# Patient Record
Sex: FEMALE | Race: WHITE
[De-identification: names, ages, dates, MRNs, and addresses within clinical notes are randomized per-mention and may not be internally consistent; named-entity substitution may affect disease eponyms.]

---

## 2018-09-25 PROBLEM — Z00.00 ENCOUNTER FOR PREVENTIVE HEALTH EXAMINATION: Status: ACTIVE | Noted: 2018-09-25

## 2018-10-25 ENCOUNTER — APPOINTMENT (OUTPATIENT)
Dept: OBGYN | Facility: CLINIC | Age: 73
End: 2018-10-25
Payer: MEDICARE

## 2018-10-25 VITALS
HEIGHT: 60 IN | DIASTOLIC BLOOD PRESSURE: 74 MMHG | SYSTOLIC BLOOD PRESSURE: 120 MMHG | TEMPERATURE: 98.4 F | BODY MASS INDEX: 33.38 KG/M2 | WEIGHT: 170 LBS

## 2018-10-25 DIAGNOSIS — Z01.419 ENCOUNTER FOR GYNECOLOGICAL EXAMINATION (GENERAL) (ROUTINE) W/OUT ABNORMAL FINDINGS: ICD-10-CM

## 2018-10-25 PROCEDURE — 82270 OCCULT BLOOD FECES: CPT

## 2018-10-25 PROCEDURE — G0101: CPT

## 2018-10-29 LAB — CYTOLOGY CVX/VAG DOC THIN PREP: NORMAL

## 2021-01-01 ENCOUNTER — TRANSCRIPTION ENCOUNTER (OUTPATIENT)
Age: 76
End: 2021-01-01

## 2021-10-25 ENCOUNTER — TRANSCRIPTION ENCOUNTER (OUTPATIENT)
Age: 76
End: 2021-10-25

## 2021-11-24 ENCOUNTER — TRANSCRIPTION ENCOUNTER (OUTPATIENT)
Age: 76
End: 2021-11-24

## 2022-08-24 ENCOUNTER — INPATIENT (INPATIENT)
Facility: HOSPITAL | Age: 77
LOS: 4 days | Discharge: HOME | DRG: 417 | End: 2022-08-29
Attending: SURGERY | Admitting: SURGERY
Payer: MEDICARE

## 2022-08-24 VITALS — HEIGHT: 60 IN | WEIGHT: 164.91 LBS

## 2022-08-24 DIAGNOSIS — Z98.49 CATARACT EXTRACTION STATUS, UNSPECIFIED EYE: Chronic | ICD-10-CM

## 2022-08-24 DIAGNOSIS — Z96.659 PRESENCE OF UNSPECIFIED ARTIFICIAL KNEE JOINT: Chronic | ICD-10-CM

## 2022-08-24 DIAGNOSIS — K81.0 ACUTE CHOLECYSTITIS: ICD-10-CM

## 2022-08-24 LAB
ADD ON TEST-SPECIMEN IN LAB: SIGNIFICANT CHANGE UP
ALBUMIN SERPL ELPH-MCNC: 4.3 G/DL — SIGNIFICANT CHANGE UP (ref 3.3–5)
ALP SERPL-CCNC: 91 U/L — SIGNIFICANT CHANGE UP (ref 40–120)
ALT FLD-CCNC: 27 U/L — SIGNIFICANT CHANGE UP (ref 12–78)
ANION GAP SERPL CALC-SCNC: 4 MMOL/L — LOW (ref 5–17)
AST SERPL-CCNC: 25 U/L — SIGNIFICANT CHANGE UP (ref 15–37)
BASOPHILS # BLD AUTO: 0 K/UL — SIGNIFICANT CHANGE UP (ref 0–0.2)
BASOPHILS NFR BLD AUTO: 0 % — SIGNIFICANT CHANGE UP (ref 0–2)
BILIRUB SERPL-MCNC: 0.7 MG/DL — SIGNIFICANT CHANGE UP (ref 0.2–1.2)
BUN SERPL-MCNC: 14 MG/DL — SIGNIFICANT CHANGE UP (ref 7–23)
CALCIUM SERPL-MCNC: 9.7 MG/DL — SIGNIFICANT CHANGE UP (ref 8.5–10.1)
CHLORIDE SERPL-SCNC: 102 MMOL/L — SIGNIFICANT CHANGE UP (ref 96–108)
CO2 SERPL-SCNC: 30 MMOL/L — SIGNIFICANT CHANGE UP (ref 22–31)
CREAT SERPL-MCNC: 0.78 MG/DL — SIGNIFICANT CHANGE UP (ref 0.5–1.3)
EGFR: 78 ML/MIN/1.73M2 — SIGNIFICANT CHANGE UP
EOSINOPHIL # BLD AUTO: 0 K/UL — SIGNIFICANT CHANGE UP (ref 0–0.5)
EOSINOPHIL NFR BLD AUTO: 0 % — SIGNIFICANT CHANGE UP (ref 0–6)
FLUAV AG NPH QL: SIGNIFICANT CHANGE UP
FLUBV AG NPH QL: SIGNIFICANT CHANGE UP
GLUCOSE SERPL-MCNC: 164 MG/DL — HIGH (ref 70–99)
HCT VFR BLD CALC: 46.9 % — HIGH (ref 34.5–45)
HGB BLD-MCNC: 16.3 G/DL — HIGH (ref 11.5–15.5)
LACTATE SERPL-SCNC: 1.5 MMOL/L — SIGNIFICANT CHANGE UP (ref 0.7–2)
LIDOCAIN IGE QN: 218 U/L — SIGNIFICANT CHANGE UP (ref 73–393)
LYMPHOCYTES # BLD AUTO: 32 % — SIGNIFICANT CHANGE UP (ref 13–44)
LYMPHOCYTES # BLD AUTO: 5.81 K/UL — HIGH (ref 1–3.3)
MCHC RBC-ENTMCNC: 32.3 PG — SIGNIFICANT CHANGE UP (ref 27–34)
MCHC RBC-ENTMCNC: 34.8 GM/DL — SIGNIFICANT CHANGE UP (ref 32–36)
MCV RBC AUTO: 93.1 FL — SIGNIFICANT CHANGE UP (ref 80–100)
MONOCYTES # BLD AUTO: 0.54 K/UL — SIGNIFICANT CHANGE UP (ref 0–0.9)
MONOCYTES NFR BLD AUTO: 3 % — SIGNIFICANT CHANGE UP (ref 2–14)
NEUTROPHILS # BLD AUTO: 11.8 K/UL — HIGH (ref 1.8–7.4)
NEUTROPHILS NFR BLD AUTO: 64 % — SIGNIFICANT CHANGE UP (ref 43–77)
NRBC # BLD: SIGNIFICANT CHANGE UP /100 WBCS (ref 0–0)
PLATELET # BLD AUTO: 245 K/UL — SIGNIFICANT CHANGE UP (ref 150–400)
POTASSIUM SERPL-MCNC: 3.7 MMOL/L — SIGNIFICANT CHANGE UP (ref 3.5–5.3)
POTASSIUM SERPL-SCNC: 3.7 MMOL/L — SIGNIFICANT CHANGE UP (ref 3.5–5.3)
PROT SERPL-MCNC: 8.5 GM/DL — HIGH (ref 6–8.3)
RBC # BLD: 5.04 M/UL — SIGNIFICANT CHANGE UP (ref 3.8–5.2)
RBC # FLD: 12.1 % — SIGNIFICANT CHANGE UP (ref 10.3–14.5)
RSV RNA NPH QL NAA+NON-PROBE: SIGNIFICANT CHANGE UP
SARS-COV-2 RNA SPEC QL NAA+PROBE: SIGNIFICANT CHANGE UP
SODIUM SERPL-SCNC: 136 MMOL/L — SIGNIFICANT CHANGE UP (ref 135–145)
WBC # BLD: 18.16 K/UL — HIGH (ref 3.8–10.5)
WBC # FLD AUTO: 18.16 K/UL — HIGH (ref 3.8–10.5)

## 2022-08-24 PROCEDURE — 78226 HEPATOBILIARY SYSTEM IMAGING: CPT

## 2022-08-24 PROCEDURE — 85025 COMPLETE CBC W/AUTO DIFF WBC: CPT

## 2022-08-24 PROCEDURE — 80053 COMPREHEN METABOLIC PANEL: CPT

## 2022-08-24 PROCEDURE — 80048 BASIC METABOLIC PNL TOTAL CA: CPT

## 2022-08-24 PROCEDURE — 76705 ECHO EXAM OF ABDOMEN: CPT | Mod: 26

## 2022-08-24 PROCEDURE — 97116 GAIT TRAINING THERAPY: CPT | Mod: GP

## 2022-08-24 PROCEDURE — 71045 X-RAY EXAM CHEST 1 VIEW: CPT

## 2022-08-24 PROCEDURE — A9537: CPT

## 2022-08-24 PROCEDURE — 71275 CT ANGIOGRAPHY CHEST: CPT

## 2022-08-24 PROCEDURE — 82248 BILIRUBIN DIRECT: CPT

## 2022-08-24 PROCEDURE — 99285 EMERGENCY DEPT VISIT HI MDM: CPT | Mod: FS

## 2022-08-24 PROCEDURE — 88304 TISSUE EXAM BY PATHOLOGIST: CPT

## 2022-08-24 PROCEDURE — 86901 BLOOD TYPING SEROLOGIC RH(D): CPT

## 2022-08-24 PROCEDURE — 81001 URINALYSIS AUTO W/SCOPE: CPT

## 2022-08-24 PROCEDURE — 94760 N-INVAS EAR/PLS OXIMETRY 1: CPT

## 2022-08-24 PROCEDURE — 85610 PROTHROMBIN TIME: CPT

## 2022-08-24 PROCEDURE — 86850 RBC ANTIBODY SCREEN: CPT

## 2022-08-24 PROCEDURE — 83880 ASSAY OF NATRIURETIC PEPTIDE: CPT

## 2022-08-24 PROCEDURE — C1889: CPT

## 2022-08-24 PROCEDURE — 94640 AIRWAY INHALATION TREATMENT: CPT

## 2022-08-24 PROCEDURE — 36415 COLL VENOUS BLD VENIPUNCTURE: CPT

## 2022-08-24 PROCEDURE — 97162 PT EVAL MOD COMPLEX 30 MIN: CPT | Mod: GP

## 2022-08-24 PROCEDURE — 85730 THROMBOPLASTIN TIME PARTIAL: CPT

## 2022-08-24 PROCEDURE — 93005 ELECTROCARDIOGRAM TRACING: CPT

## 2022-08-24 PROCEDURE — 84100 ASSAY OF PHOSPHORUS: CPT

## 2022-08-24 PROCEDURE — 86900 BLOOD TYPING SEROLOGIC ABO: CPT

## 2022-08-24 PROCEDURE — 83735 ASSAY OF MAGNESIUM: CPT

## 2022-08-24 RX ORDER — PIPERACILLIN AND TAZOBACTAM 4; .5 G/20ML; G/20ML
3.38 INJECTION, POWDER, LYOPHILIZED, FOR SOLUTION INTRAVENOUS ONCE
Refills: 0 | Status: COMPLETED | OUTPATIENT
Start: 2022-08-24 | End: 2022-08-24

## 2022-08-24 RX ORDER — BRIMONIDINE TARTRATE 2 MG/MG
1 SOLUTION/ DROPS OPHTHALMIC DAILY
Refills: 0 | Status: DISCONTINUED | OUTPATIENT
Start: 2022-08-24 | End: 2022-08-24

## 2022-08-24 RX ORDER — BRIMONIDINE TARTRATE 2 MG/MG
1 SOLUTION/ DROPS OPHTHALMIC
Refills: 0 | Status: DISCONTINUED | OUTPATIENT
Start: 2022-08-24 | End: 2022-08-29

## 2022-08-24 RX ORDER — ACETAMINOPHEN 500 MG
1000 TABLET ORAL ONCE
Refills: 0 | Status: COMPLETED | OUTPATIENT
Start: 2022-08-24 | End: 2022-08-24

## 2022-08-24 RX ORDER — KETOROLAC TROMETHAMINE 30 MG/ML
15 SYRINGE (ML) INJECTION ONCE
Refills: 0 | Status: DISCONTINUED | OUTPATIENT
Start: 2022-08-24 | End: 2022-08-25

## 2022-08-24 RX ORDER — ONDANSETRON 8 MG/1
4 TABLET, FILM COATED ORAL ONCE
Refills: 0 | Status: COMPLETED | OUTPATIENT
Start: 2022-08-24 | End: 2022-08-24

## 2022-08-24 RX ORDER — ONDANSETRON 8 MG/1
4 TABLET, FILM COATED ORAL EVERY 6 HOURS
Refills: 0 | Status: DISCONTINUED | OUTPATIENT
Start: 2022-08-24 | End: 2022-08-29

## 2022-08-24 RX ORDER — SENNA PLUS 8.6 MG/1
2 TABLET ORAL AT BEDTIME
Refills: 0 | Status: DISCONTINUED | OUTPATIENT
Start: 2022-08-24 | End: 2022-08-29

## 2022-08-24 RX ORDER — ENOXAPARIN SODIUM 100 MG/ML
40 INJECTION SUBCUTANEOUS EVERY 24 HOURS
Refills: 0 | Status: DISCONTINUED | OUTPATIENT
Start: 2022-08-24 | End: 2022-08-29

## 2022-08-24 RX ORDER — AMLODIPINE BESYLATE 2.5 MG/1
5 TABLET ORAL AT BEDTIME
Refills: 0 | Status: DISCONTINUED | OUTPATIENT
Start: 2022-08-24 | End: 2022-08-28

## 2022-08-24 RX ORDER — CIPROFLOXACIN LACTATE 400MG/40ML
400 VIAL (ML) INTRAVENOUS EVERY 12 HOURS
Refills: 0 | Status: DISCONTINUED | OUTPATIENT
Start: 2022-08-24 | End: 2022-08-29

## 2022-08-24 RX ORDER — LOSARTAN POTASSIUM 100 MG/1
50 TABLET, FILM COATED ORAL DAILY
Refills: 0 | Status: DISCONTINUED | OUTPATIENT
Start: 2022-08-24 | End: 2022-08-28

## 2022-08-24 RX ORDER — CEFTRIAXONE 500 MG/1
1000 INJECTION, POWDER, FOR SOLUTION INTRAMUSCULAR; INTRAVENOUS EVERY 24 HOURS
Refills: 0 | Status: DISCONTINUED | OUTPATIENT
Start: 2022-08-24 | End: 2022-08-24

## 2022-08-24 RX ORDER — MORPHINE SULFATE 50 MG/1
2 CAPSULE, EXTENDED RELEASE ORAL EVERY 4 HOURS
Refills: 0 | Status: DISCONTINUED | OUTPATIENT
Start: 2022-08-24 | End: 2022-08-29

## 2022-08-24 RX ORDER — SODIUM CHLORIDE 9 MG/ML
1000 INJECTION INTRAMUSCULAR; INTRAVENOUS; SUBCUTANEOUS ONCE
Refills: 0 | Status: COMPLETED | OUTPATIENT
Start: 2022-08-24 | End: 2022-08-24

## 2022-08-24 RX ORDER — SODIUM CHLORIDE 9 MG/ML
1000 INJECTION INTRAMUSCULAR; INTRAVENOUS; SUBCUTANEOUS
Refills: 0 | Status: DISCONTINUED | OUTPATIENT
Start: 2022-08-24 | End: 2022-08-27

## 2022-08-24 RX ORDER — METRONIDAZOLE 500 MG
500 TABLET ORAL EVERY 8 HOURS
Refills: 0 | Status: DISCONTINUED | OUTPATIENT
Start: 2022-08-24 | End: 2022-08-29

## 2022-08-24 RX ORDER — ACETAMINOPHEN 500 MG
1000 TABLET ORAL ONCE
Refills: 0 | Status: DISCONTINUED | OUTPATIENT
Start: 2022-08-24 | End: 2022-08-29

## 2022-08-24 RX ADMIN — SODIUM CHLORIDE 1000 MILLILITER(S): 9 INJECTION INTRAMUSCULAR; INTRAVENOUS; SUBCUTANEOUS at 20:20

## 2022-08-24 RX ADMIN — AMLODIPINE BESYLATE 5 MILLIGRAM(S): 2.5 TABLET ORAL at 22:13

## 2022-08-24 RX ADMIN — ENOXAPARIN SODIUM 40 MILLIGRAM(S): 100 INJECTION SUBCUTANEOUS at 22:13

## 2022-08-24 RX ADMIN — PIPERACILLIN AND TAZOBACTAM 3.38 GRAM(S): 4; .5 INJECTION, POWDER, LYOPHILIZED, FOR SOLUTION INTRAVENOUS at 20:20

## 2022-08-24 RX ADMIN — Medication 100 MILLIGRAM(S): at 22:57

## 2022-08-24 RX ADMIN — ONDANSETRON 4 MILLIGRAM(S): 8 TABLET, FILM COATED ORAL at 19:08

## 2022-08-24 RX ADMIN — PIPERACILLIN AND TAZOBACTAM 200 GRAM(S): 4; .5 INJECTION, POWDER, LYOPHILIZED, FOR SOLUTION INTRAVENOUS at 20:10

## 2022-08-24 RX ADMIN — Medication 1000 MILLIGRAM(S): at 20:20

## 2022-08-24 RX ADMIN — SODIUM CHLORIDE 100 MILLILITER(S): 9 INJECTION INTRAMUSCULAR; INTRAVENOUS; SUBCUTANEOUS at 22:13

## 2022-08-24 RX ADMIN — Medication 400 MILLIGRAM(S): at 19:09

## 2022-08-24 RX ADMIN — SODIUM CHLORIDE 1000 MILLILITER(S): 9 INJECTION INTRAMUSCULAR; INTRAVENOUS; SUBCUTANEOUS at 19:09

## 2022-08-24 NOTE — ED STATDOCS - OBJECTIVE STATEMENT
76 y/o female with PMHx of bronchiectasis, HTN, gallstone presents to the ED c/o worsening epigastric pain radiating to back associated with nausea and vomiting started last night before eating. No fevers, cp, sob. No prior hx of abdominal surgeries.

## 2022-08-24 NOTE — ED STATDOCS - ATTENDING APP SHARED VISIT CONTRIBUTION OF CARE
I, Parviz Han MD, personally saw the patient with ALBARO.  I have personally performed a face to face diagnostic evaluation on this patient.  I have reviewed the ALBARO note and agree with the history, exam, and plan of care, except as noted.

## 2022-08-24 NOTE — H&P ADULT - ASSESSMENT
76 yo female with RUQ pain, WBC elevated, US suggestive of acute cholecystitis.    Plan:    Admit to surgery  Pain control  Nausea contrl  IV antibiotics  IVF  HIDA am  Medical clearance    Case discussed with Dr. Aldana

## 2022-08-24 NOTE — PHARMACOTHERAPY INTERVENTION NOTE - COMMENTS
Medication reconciliation completed.  Reviewed Medication list and confirmed med allergies with patient; confirmed with Dr. Quintana MedHx.  pt confirms that she did not take any oral medication today, only the morning dose of Alphagan P eye drops.

## 2022-08-24 NOTE — ED STATDOCS - NS ED ATTENDING STATEMENT MOD
This was a shared visit with the ALBARO. I reviewed and verified the documentation and independently performed the documented:

## 2022-08-24 NOTE — ED STATDOCS - CLINICAL SUMMARY MEDICAL DECISION MAKING FREE TEXT BOX
Labs, urine, US, Zofran, reassess. Labs, urine, US, Zofran, reassess.    PA: Patient presented with RUQ pain since last night. SONO +cam for acute cholecystitis. Admitted to surgery. Zosyn IVx1. ~Leon Goldman PA-C

## 2022-08-24 NOTE — ED ADULT NURSE NOTE - OBJECTIVE STATEMENT
presents to ed with with right upper abdominal pain. patient states that she was sent in by GI doctor. patient states constantly burping and feeling gassy

## 2022-08-24 NOTE — ED STATDOCS - PROGRESS NOTE DETAILS
PA: Patient is a 76 y/o female with PMHx of bronchiectasis, HTN, gallstone who presents to [facility] c/o  worsening epigastric pain radiating to back associated with nausea and vomiting since last night. No fever, cp, sob. No prior hx of abdominal surgeries. ~Leon Golmdan PA-C +Cholecystitis on RUQ SONO. Paged surgery resident, will call back. ~Leon Goldman PA-C Spoke with surgery, will admit to dr. Aldana, wants medicine consult for clearance, NPO, ABX. ~Leon Goldman PA-C Spoke with dr. Roche for med clearance. Dr. Roche would like a direct phone call from surgery for medical clearance. ~Leon Goldman PA-C

## 2022-08-24 NOTE — H&P ADULT - NSHPPHYSICALEXAM_GEN_ALL_CORE
Vitals:  T(C): 36.9 (08-24 @ 17:29), Max: 36.9 (08-24 @ 17:29)  HR: 75 (08-24 @ 17:29) (75 - 75)  BP: 149/76 (08-24 @ 17:29) (149/76 - 149/76)  RR: 18 (08-24 @ 17:29) (18 - 18)  SpO2: 94% (08-24 @ 17:29) (94% - 94%)      Physical Exam:  General: AAOx3, Well developed, NAD  Chest: Normal respiratory effort  Heart: RRR  Abdomen: Soft, tender to palpation in the RUQ  Neuro/Psych: No localized deficits. Normal speech, normal tone  Skin: Normal, no rashes, no lesions noted.   Extremities: Warm, well perfused, no edema, Pulses intact

## 2022-08-24 NOTE — ED STATDOCS - PHYSICAL EXAMINATION
General: AAOx3, NAD  HEENT: NCAT  Cardiac: Normal rate and rhythym, no murmurs, normal peripheral perfusion  Respiratory: Normal rate and effort. CTAB  GI: Soft, nondistended. +moderate RUQ TTP, -burnett's. +mild epigastric TTP. no CVAT.  Neuro: No focal deficits. FRAUSTO equally x4, sensation to light touch intact throughout  MSK: FROMx4, no focal bony tenderness, no peripheral edema  Skin: No rash Attending: General: AAOx3, NAD  HEENT: NCAT  Cardiac: Normal rate and rhythym, no murmurs, normal peripheral perfusion  Respiratory: Normal rate and effort. CTAB  GI: Soft, nondistended. +moderate RUQ TTP, -burnett's. +mild epigastric TTP. no CVAT.  Neuro: No focal deficits. FRAUSTO equally x4, sensation to light touch intact throughout  MSK: FROMx4, no focal bony tenderness, no peripheral edema  Skin: No rash

## 2022-08-24 NOTE — ED STATDOCS - NS_ ATTENDINGSCRIBEDETAILS _ED_A_ED_FT
I, Parviz Han MD,  performed the initial face to face bedside interview with this patient regarding history of present illness, review of symptoms and relevant past medical, social and family history.  I completed an independent physical examination.  I was the initial provider who evaluated this patient. I have signed out the follow up of any pending tests (i.e. labs, radiological studies) to the ALBARO.  I have communicated the patient’s plan of care and disposition with the ALBARO.  The history, relevant review of systems, past medical and surgical history, medical decision making, and physical examination was documented by the scribe in my presence and I attest to the accuracy of the documentation.

## 2022-08-24 NOTE — PATIENT PROFILE ADULT - FALL HARM RISK - UNIVERSAL INTERVENTIONS
Bed in lowest position, wheels locked, appropriate side rails in place/Call bell, personal items and telephone in reach/Instruct patient to call for assistance before getting out of bed or chair/Non-slip footwear when patient is out of bed/[place] to call system/Physically safe environment - no spills, clutter or unnecessary equipment/Purposeful Proactive Rounding/Room/bathroom lighting operational, light cord in reach

## 2022-08-24 NOTE — H&P ADULT - NSHPLABSRESULTS_GEN_ALL_CORE
08-24 @ 18:51                    16.3  CBC: 18.16>)-------(<245                     46.9                 136 | 102 | 14    CMP:  ----------------------< 164               3.7 | 30 | 0.78                      Ca:9.7  Phos:-  Mg:-               0.7|      |25        LFTs:  ------|91|-----             -|      |-      Current Inpatient Medications:  acetaminophen   IVPB .. 1000 milliGRAM(s) IV Intermittent once  amLODIPine   Tablet 5 milliGRAM(s) Oral at bedtime  enoxaparin Injectable 40 milliGRAM(s) SubCutaneous every 24 hours  ketorolac   Injectable 15 milliGRAM(s) IV Push once PRN  losartan 50 milliGRAM(s) Oral daily  morphine  - Injectable 2 milliGRAM(s) IV Push every 4 hours PRN  ondansetron Injectable 4 milliGRAM(s) IV Push every 6 hours PRN  senna 2 Tablet(s) Oral at bedtime PRN  sodium chloride 0.9%. 1000 milliLiter(s) (100 mL/Hr) IV Continuous <Continuous>    < from: US Abdomen Upper Quadrant Right (08.24.22 @ 18:54) >    IMPRESSION:    Gallstones with gallbladder wall thickening and sonographic Urena sign   suggesting acute cholecystitis.    < end of copied text >

## 2022-08-24 NOTE — ED STATDOCS - NS ED ROS FT
Constitutional: No fevers, chills, or sweats.  Cardiac: No chest pain, exertional dyspnea, orthopnea  Respiratory: No shortness of breath, no cough  GI: +abd pain, +n/v  Neuro: No headaches, no neck pain/stiffness, no numbness  All other systems reviewed and are negative unless otherwise stated in the HPI.

## 2022-08-24 NOTE — ED ADULT TRIAGE NOTE - CHIEF COMPLAINT QUOTE
abd pain associated with nausea and vomiting started last night. hx gallstones and ulcers. pt was sent by GI for further evaluation. Denies fever/chills, diarrhea.

## 2022-08-25 LAB
ALBUMIN SERPL ELPH-MCNC: 3.4 G/DL — SIGNIFICANT CHANGE UP (ref 3.3–5)
ALP SERPL-CCNC: 76 U/L — SIGNIFICANT CHANGE UP (ref 40–120)
ALT FLD-CCNC: 26 U/L — SIGNIFICANT CHANGE UP (ref 12–78)
ANION GAP SERPL CALC-SCNC: 3 MMOL/L — LOW (ref 5–17)
APPEARANCE UR: CLEAR — SIGNIFICANT CHANGE UP
AST SERPL-CCNC: 33 U/L — SIGNIFICANT CHANGE UP (ref 15–37)
BASOPHILS # BLD AUTO: 0.04 K/UL — SIGNIFICANT CHANGE UP (ref 0–0.2)
BASOPHILS NFR BLD AUTO: 0.3 % — SIGNIFICANT CHANGE UP (ref 0–2)
BILIRUB SERPL-MCNC: 0.8 MG/DL — SIGNIFICANT CHANGE UP (ref 0.2–1.2)
BILIRUB UR-MCNC: NEGATIVE — SIGNIFICANT CHANGE UP
BUN SERPL-MCNC: 9 MG/DL — SIGNIFICANT CHANGE UP (ref 7–23)
CALCIUM SERPL-MCNC: 9.2 MG/DL — SIGNIFICANT CHANGE UP (ref 8.5–10.1)
CHLORIDE SERPL-SCNC: 107 MMOL/L — SIGNIFICANT CHANGE UP (ref 96–108)
CO2 SERPL-SCNC: 30 MMOL/L — SIGNIFICANT CHANGE UP (ref 22–31)
COLOR SPEC: YELLOW — SIGNIFICANT CHANGE UP
CREAT SERPL-MCNC: 0.77 MG/DL — SIGNIFICANT CHANGE UP (ref 0.5–1.3)
DIFF PNL FLD: ABNORMAL
EGFR: 79 ML/MIN/1.73M2 — SIGNIFICANT CHANGE UP
EOSINOPHIL # BLD AUTO: 0.02 K/UL — SIGNIFICANT CHANGE UP (ref 0–0.5)
EOSINOPHIL NFR BLD AUTO: 0.1 % — SIGNIFICANT CHANGE UP (ref 0–6)
GLUCOSE SERPL-MCNC: 182 MG/DL — HIGH (ref 70–99)
GLUCOSE UR QL: NEGATIVE — SIGNIFICANT CHANGE UP
HCT VFR BLD CALC: 43.5 % — SIGNIFICANT CHANGE UP (ref 34.5–45)
HGB BLD-MCNC: 14.6 G/DL — SIGNIFICANT CHANGE UP (ref 11.5–15.5)
IMM GRANULOCYTES NFR BLD AUTO: 0.6 % — SIGNIFICANT CHANGE UP (ref 0–1.5)
KETONES UR-MCNC: NEGATIVE — SIGNIFICANT CHANGE UP
LEUKOCYTE ESTERASE UR-ACNC: NEGATIVE — SIGNIFICANT CHANGE UP
LYMPHOCYTES # BLD AUTO: 22.9 % — SIGNIFICANT CHANGE UP (ref 13–44)
LYMPHOCYTES # BLD AUTO: 3.35 K/UL — HIGH (ref 1–3.3)
MAGNESIUM SERPL-MCNC: 2.3 MG/DL — SIGNIFICANT CHANGE UP (ref 1.6–2.6)
MCHC RBC-ENTMCNC: 31.9 PG — SIGNIFICANT CHANGE UP (ref 27–34)
MCHC RBC-ENTMCNC: 33.6 GM/DL — SIGNIFICANT CHANGE UP (ref 32–36)
MCV RBC AUTO: 95 FL — SIGNIFICANT CHANGE UP (ref 80–100)
MONOCYTES # BLD AUTO: 1.13 K/UL — HIGH (ref 0–0.9)
MONOCYTES NFR BLD AUTO: 7.7 % — SIGNIFICANT CHANGE UP (ref 2–14)
NEUTROPHILS # BLD AUTO: 9.98 K/UL — HIGH (ref 1.8–7.4)
NEUTROPHILS NFR BLD AUTO: 68.4 % — SIGNIFICANT CHANGE UP (ref 43–77)
NITRITE UR-MCNC: NEGATIVE — SIGNIFICANT CHANGE UP
PH UR: 6 — SIGNIFICANT CHANGE UP (ref 5–8)
PHOSPHATE SERPL-MCNC: 2 MG/DL — LOW (ref 2.5–4.5)
PLATELET # BLD AUTO: 205 K/UL — SIGNIFICANT CHANGE UP (ref 150–400)
POTASSIUM SERPL-MCNC: 3.6 MMOL/L — SIGNIFICANT CHANGE UP (ref 3.5–5.3)
POTASSIUM SERPL-SCNC: 3.6 MMOL/L — SIGNIFICANT CHANGE UP (ref 3.5–5.3)
PROT SERPL-MCNC: 7.3 GM/DL — SIGNIFICANT CHANGE UP (ref 6–8.3)
PROT UR-MCNC: 30 MG/DL
RBC # BLD: 4.58 M/UL — SIGNIFICANT CHANGE UP (ref 3.8–5.2)
RBC # FLD: 12.3 % — SIGNIFICANT CHANGE UP (ref 10.3–14.5)
SODIUM SERPL-SCNC: 140 MMOL/L — SIGNIFICANT CHANGE UP (ref 135–145)
SP GR SPEC: 1.01 — SIGNIFICANT CHANGE UP (ref 1.01–1.02)
UROBILINOGEN FLD QL: NEGATIVE — SIGNIFICANT CHANGE UP
WBC # BLD: 14.61 K/UL — HIGH (ref 3.8–10.5)
WBC # FLD AUTO: 14.61 K/UL — HIGH (ref 3.8–10.5)

## 2022-08-25 PROCEDURE — 78226 HEPATOBILIARY SYSTEM IMAGING: CPT | Mod: 26

## 2022-08-25 PROCEDURE — 93010 ELECTROCARDIOGRAM REPORT: CPT

## 2022-08-25 PROCEDURE — 99221 1ST HOSP IP/OBS SF/LOW 40: CPT

## 2022-08-25 PROCEDURE — 71045 X-RAY EXAM CHEST 1 VIEW: CPT | Mod: 26

## 2022-08-25 RX ORDER — LUTEIN 20 MG
1 CAPSULE ORAL
Qty: 0 | Refills: 0 | DISCHARGE

## 2022-08-25 RX ORDER — CHOLECALCIFEROL (VITAMIN D3) 125 MCG
1 CAPSULE ORAL
Qty: 0 | Refills: 0 | DISCHARGE

## 2022-08-25 RX ORDER — DESVENLAFAXINE 50 MG/1
1 TABLET, EXTENDED RELEASE ORAL
Qty: 0 | Refills: 0 | DISCHARGE

## 2022-08-25 RX ORDER — MORPHINE SULFATE 50 MG/1
2 CAPSULE, EXTENDED RELEASE ORAL ONCE
Refills: 0 | Status: DISCONTINUED | OUTPATIENT
Start: 2022-08-25 | End: 2022-08-25

## 2022-08-25 RX ORDER — OMEGA-3 ACID ETHYL ESTERS 1 G
1 CAPSULE ORAL
Qty: 0 | Refills: 0 | DISCHARGE

## 2022-08-25 RX ORDER — ACETAMINOPHEN 500 MG
1000 TABLET ORAL ONCE
Refills: 0 | Status: COMPLETED | OUTPATIENT
Start: 2022-08-25 | End: 2022-08-25

## 2022-08-25 RX ORDER — POTASSIUM PHOSPHATE, MONOBASIC POTASSIUM PHOSPHATE, DIBASIC 236; 224 MG/ML; MG/ML
15 INJECTION, SOLUTION INTRAVENOUS ONCE
Refills: 0 | Status: COMPLETED | OUTPATIENT
Start: 2022-08-25 | End: 2022-08-25

## 2022-08-25 RX ORDER — AZITHROMYCIN 500 MG/1
250 TABLET, FILM COATED ORAL
Refills: 0 | Status: DISCONTINUED | OUTPATIENT
Start: 2022-08-25 | End: 2022-08-29

## 2022-08-25 RX ORDER — NEBIVOLOL HYDROCHLORIDE 5 MG/1
1 TABLET ORAL
Qty: 0 | Refills: 0 | DISCHARGE

## 2022-08-25 RX ORDER — DESVENLAFAXINE 50 MG/1
25 TABLET, EXTENDED RELEASE ORAL DAILY
Refills: 0 | Status: DISCONTINUED | OUTPATIENT
Start: 2022-08-25 | End: 2022-08-29

## 2022-08-25 RX ORDER — BRIMONIDINE TARTRATE 2 MG/MG
1 SOLUTION/ DROPS OPHTHALMIC
Qty: 0 | Refills: 0 | DISCHARGE

## 2022-08-25 RX ORDER — NEBIVOLOL HYDROCHLORIDE 5 MG/1
10 TABLET ORAL DAILY
Refills: 0 | Status: DISCONTINUED | OUTPATIENT
Start: 2022-08-25 | End: 2022-08-29

## 2022-08-25 RX ORDER — UBIDECARENONE 100 MG
1 CAPSULE ORAL
Qty: 0 | Refills: 0 | DISCHARGE

## 2022-08-25 RX ORDER — OLMESARTAN MEDOXOMIL 5 MG/1
1 TABLET, FILM COATED ORAL
Qty: 0 | Refills: 0 | DISCHARGE

## 2022-08-25 RX ADMIN — Medication 1000 MILLIGRAM(S): at 21:58

## 2022-08-25 RX ADMIN — Medication 100 MILLIGRAM(S): at 06:58

## 2022-08-25 RX ADMIN — Medication 200 MILLIGRAM(S): at 05:26

## 2022-08-25 RX ADMIN — Medication 200 MILLIGRAM(S): at 21:51

## 2022-08-25 RX ADMIN — Medication 400 MILLIGRAM(S): at 15:34

## 2022-08-25 RX ADMIN — BRIMONIDINE TARTRATE 1 DROP(S): 2 SOLUTION/ DROPS OPHTHALMIC at 21:29

## 2022-08-25 RX ADMIN — Medication 100 MILLIGRAM(S): at 23:13

## 2022-08-25 RX ADMIN — Medication 400 MILLIGRAM(S): at 21:28

## 2022-08-25 RX ADMIN — AMLODIPINE BESYLATE 5 MILLIGRAM(S): 2.5 TABLET ORAL at 21:29

## 2022-08-25 RX ADMIN — LOSARTAN POTASSIUM 50 MILLIGRAM(S): 100 TABLET, FILM COATED ORAL at 09:08

## 2022-08-25 RX ADMIN — Medication 15 MILLIGRAM(S): at 06:40

## 2022-08-25 RX ADMIN — POTASSIUM PHOSPHATE, MONOBASIC POTASSIUM PHOSPHATE, DIBASIC 62.5 MILLIMOLE(S): 236; 224 INJECTION, SOLUTION INTRAVENOUS at 15:04

## 2022-08-25 RX ADMIN — MORPHINE SULFATE 2 MILLIGRAM(S): 50 CAPSULE, EXTENDED RELEASE ORAL at 11:02

## 2022-08-25 RX ADMIN — DESVENLAFAXINE 25 MILLIGRAM(S): 50 TABLET, EXTENDED RELEASE ORAL at 20:00

## 2022-08-25 RX ADMIN — Medication 100 MILLIGRAM(S): at 13:07

## 2022-08-25 RX ADMIN — ENOXAPARIN SODIUM 40 MILLIGRAM(S): 100 INJECTION SUBCUTANEOUS at 21:29

## 2022-08-25 RX ADMIN — Medication 1000 MILLIGRAM(S): at 15:49

## 2022-08-25 RX ADMIN — BRIMONIDINE TARTRATE 1 DROP(S): 2 SOLUTION/ DROPS OPHTHALMIC at 09:08

## 2022-08-25 RX ADMIN — MORPHINE SULFATE 2 MILLIGRAM(S): 50 CAPSULE, EXTENDED RELEASE ORAL at 11:17

## 2022-08-25 NOTE — CONSULT NOTE ADULT - SUBJECTIVE AND OBJECTIVE BOX
Chief Complaint:    HPI:  76 y/o female with PMHx of bronchiectasis, HTN, gallstone presents to the ED c/o worsening epigastric pain radiating to back associated with nausea and vomiting started last night before eating. No fevers, cp, sob. No prior hx of abdominal surgeries. (24 Aug 2022 21:25)      REVIEW OF SYSTEMS:    CONSTITUTIONAL: No weakness, fevers or chills  EYES/ENT: No visual changes;  No vertigo or throat pain   NECK: No pain or stiffness  RESPIRATORY: No cough, wheezing, hemoptysis; No shortness of breath  CARDIOVASCULAR: No chest pain or palpitations  GASTROINTESTINAL: No abdominal or epigastric pain. No nausea, vomiting, or hematemesis; No diarrhea or constipation. No melena or hematochezia.  GENITOURINARY: No dysuria, frequency or hematuria  NEUROLOGICAL: No numbness or weakness  SKIN: No itching, burning, rashes, or lesions   All other review of systems is negative unless indicated above    PAST MEDICAL & SURGICAL HISTORY:  Hypertension      OA (osteoarthritis)      History of knee replacement      S/P cataract surgery          Social history : Denies ETOH use, IVDU, smoking   No pertinent history of stroke, MI, cancer , DM  in first degree relatives.    Vital Signs Last 24 Hrs  T(C): 36.7 (25 Aug 2022 15:06), Max: 36.7 (25 Aug 2022 15:06)  T(F): 98.1 (25 Aug 2022 15:06), Max: 98.1 (25 Aug 2022 15:06)  HR: 84 (25 Aug 2022 15:06) (78 - 84)  BP: 148/57 (25 Aug 2022 15:06) (120/69 - 154/76)  BP(mean): --  RR: 18 (25 Aug 2022 15:06) (18 - 18)  SpO2: 92% (25 Aug 2022 15:06) (92% - 97%)    Parameters below as of 25 Aug 2022 15:06  Patient On (Oxygen Delivery Method): room air        I&O's Summary      CAPILLARY BLOOD GLUCOSE          PHYSICAL EXAM:    Constitutional: NAD, awake and alert, well-developed  HEENT: PERR, EOMI, Normal Hearing, MMM  Neck: Soft and supple, No LAD, No JVD  Respiratory: Breath sounds are clear bilaterally, No wheezing, rales or rhonchi  Cardiovascular: S1 and S2, regular rate and rhythm, no Murmurs, gallops or rubs  Gastrointestinal: Bowel Sounds present, soft, nontender, nondistended, no guarding, no rebound  Extremities: No peripheral edema  Vascular: 2+ peripheral pulses  Neurological: A/O x 3, no focal deficits  Musculoskeletal: 5/5 strength b/l upper and lower extremities  Skin: No rashes    Medications:  MEDICATIONS  (STANDING):  acetaminophen   IVPB .. 1000 milliGRAM(s) IV Intermittent once  amLODIPine   Tablet 5 milliGRAM(s) Oral at bedtime  azithromycin   Tablet 250 milliGRAM(s) Oral <User Schedule>  brimonidine 0.1% Ophthalmic Solution 1 Drop(s) Left EYE two times a day  ciprofloxacin   IVPB 400 milliGRAM(s) IV Intermittent every 12 hours  enoxaparin Injectable 40 milliGRAM(s) SubCutaneous every 24 hours  losartan 50 milliGRAM(s) Oral daily  metroNIDAZOLE  IVPB 500 milliGRAM(s) IV Intermittent every 8 hours  sodium chloride 0.9%. 1000 milliLiter(s) (100 mL/Hr) IV Continuous <Continuous>      Labs: All Labs Reviewed:                        14.6   14.61 )-----------( 205      ( 25 Aug 2022 06:51 )             43.5     08-25    140  |  107  |  9   ----------------------------<  182<H>  3.6   |  30  |  0.77    Ca    9.2      25 Aug 2022 06:51  Phos  2.0     08-25  Mg     2.3     08-25    TPro  7.3  /  Alb  3.4  /  TBili  0.8  /  DBili  x   /  AST  33  /  ALT  26  /  AlkPhos  76  08-25    PT/INR - ( 24 Aug 2022 18:51 )   PT: 12.8 sec;   INR: 1.10 ratio               Blood Culture:     RADIOLOGY/EKG: all reviewed     Assessment/Plan:    DVT PPX:    Advance Directive:  - Full code, diagnosis, prognosis discussed  - 17 minutes spend    Disposition:    Time Span:    Thank you for consult, will follow with you.  Chief Complaint: RUQ pain, nausea, vomiting     HPI:    76 y/o female with PMHx of bronchiectasis, HTN, HLD , OP, GERD, Anxiety, Cholelithiasis admitted to surgery service with  c/o worsening epigastric pain radiating to back associated with nausea and vomiting started  night before eating. No fevers, cp, sob. No prior hx of abdominal surgeries.      8/25 - pt seen and examined for preop clearance, denies cp, dyspnea, RUQ pain improved, afebrile, plan discussed, medications reviewed       REVIEW OF SYSTEMS:    CONSTITUTIONAL: No weakness, fevers or chills  EYES/ENT: No visual changes;  No vertigo or throat pain   NECK: No pain or stiffness  RESPIRATORY: No cough, wheezing, hemoptysis; No shortness of breath  CARDIOVASCULAR: No chest pain or palpitations  GASTROINTESTINAL: +  RUQ abdominal , no  epigastric pain. No nausea, vomiting, or hematemesis; No diarrhea or constipation. No melena or hematochezia.  GENITOURINARY: No dysuria, frequency or hematuria  NEUROLOGICAL: No numbness or weakness  SKIN: No itching, burning, rashes, or lesions   All other review of systems is negative unless indicated above    PAST MEDICAL & SURGICAL HISTORY:  Hypertension  OA (osteoarthritis)  History of knee replacement  S/P cataract surgery    Social history : Denies ETOH use, IVDU, smoking   No pertinent history of stroke, MI,  DM  in first degree relatives.    Vital Signs Last 24 Hrs  T(C): 36.7 (25 Aug 2022 15:06), Max: 36.7 (25 Aug 2022 15:06)  T(F): 98.1 (25 Aug 2022 15:06), Max: 98.1 (25 Aug 2022 15:06)  HR: 84 (25 Aug 2022 15:06) (78 - 84)  BP: 148/57 (25 Aug 2022 15:06) (120/69 - 154/76)  BP(mean): --  RR: 18 (25 Aug 2022 15:06) (18 - 18)  SpO2: 92% (25 Aug 2022 15:06) (92% - 97%)    Parameters below as of 25 Aug 2022 15:06  Patient On (Oxygen Delivery Method): room air        I&O's Summary      CAPILLARY BLOOD GLUCOSE          PHYSICAL EXAM:    Constitutional: NAD, awake and alert, well-developed  HEENT: PERR, EOMI, Normal Hearing, MMM  Neck: Soft and supple, No LAD, No JVD  Respiratory: Breath sounds are clear bilaterally, No wheezing, rales or rhonchi  Cardiovascular: S1 and S2, regular rate and rhythm, no Murmurs, gallops or rubs  Gastrointestinal: Bowel Sounds present, soft, RUQ tenderness  nondistended, no guarding, no rebound  Extremities: No peripheral edema  Vascular: 2+ peripheral pulses  Neurological: A/O x 3, no focal deficits  Musculoskeletal: 5/5 strength b/l upper and lower extremities  Skin: No rashes      Labs: All Labs Reviewed:                        14.6   14.61 )-----------( 205      ( 25 Aug 2022 06:51 )             43.5     08-25    140  |  107  |  9   ----------------------------<  182<H>  3.6   |  30  |  0.77    Ca    9.2      25 Aug 2022 06:51  Phos  2.0     08-25  Mg     2.3     08-25    TPro  7.3  /  Alb  3.4  /  TBili  0.8  /  DBili  x   /  AST  33  /  ALT  26  /  AlkPhos  76  08-25    PT/INR - ( 24 Aug 2022 18:51 )   PT: 12.8 sec;   INR: 1.10 ratio        Blood Culture:     RADIOLOGY/EKG: all reviewed    12 Lead ECG (08.25.22 @ 06:58) >  Ventricular Rate 77 BPM    Atrial Rate 77 BPM    P-R Interval 164 ms    QRS Duration 88 ms    Q-T Interval 428 ms    QTC Calculation(Bazett) 484 ms    P Axis 46 degrees    R Axis 27 degrees    T Axis 45 degrees    Diagnosis Line Normal sinus rhythm  Prolonged QT  When compared with ECG of 14-SEP-2016 15:19,  No significant change was found    < from: NM Hepatobiliary Imaging (08.25.22 @ 12:20) >  IMPRESSION:  Abnormal morphine-augmented hepatobiliary scan compatible   with acute cholecystitis.    < end of copied text >  < from: US Abdomen Upper Quadrant Right (08.24.22 @ 18:54) >  FINDINGS:  Liver: Hepatic steatosis.  Bile ducts: Normal caliber. Common bile duct measures 7 mm.  Gallbladder: Gallstones. Gallbladder wall thickening gallbladder wall   edema measuring up to 0.5 cm. Sonographic Urena sign.  Pancreas:Visualized portions are within normal limits.  Right kidney: 9.9 cm. No hydronephrosis.  Ascites: None.  IVC: Visualized portions are within normal limits.    IMPRESSION:    Gallstones with gallbladder wall thickening and sonographic Urena sign   suggesting acute cholecystitis.      < end of copied text >    Medications:  MEDICATIONS  (STANDING):  acetaminophen   IVPB .. 1000 milliGRAM(s) IV Intermittent once  amLODIPine   Tablet 5 milliGRAM(s) Oral at bedtime  azithromycin   Tablet 250 milliGRAM(s) Oral <User Schedule>  brimonidine 0.1% Ophthalmic Solution 1 Drop(s) Left EYE two times a day  ciprofloxacin   IVPB 400 milliGRAM(s) IV Intermittent every 12 hours  enoxaparin Injectable 40 milliGRAM(s) SubCutaneous every 24 hours  losartan 50 milliGRAM(s) Oral daily  metroNIDAZOLE  IVPB 500 milliGRAM(s) IV Intermittent every 8 hours  sodium chloride 0.9%. 1000 milliLiter(s) (100 mL/Hr) IV Continuous <Continuous>

## 2022-08-25 NOTE — CONSULT NOTE ADULT - ASSESSMENT
78 y/o female with PMHx of bronchiectasis, HTN, HLD , OP, GERD, Anxiety, Cholelithiasis admitted to surgery service with  c/o worsening epigastric pain radiating to back associated with nausea and vomiting started  night before eating. No fevers, cp, sob. No prior hx of abdominal surgeries.      Assessment/Plan:    RUQ abdominal pain , nausea, vomiting due to acute cholecystitis, cholelithiasis  - as per surgery   - c/w IV fluids  - IV cipro and flagyl    Pre-Surgical Evaluation medical  Clearance for surgery  Clinical cardiac predictor(s):  pulmonary disease, HTN   Surgical risk level:  High  Functional Status:  GOOD   Revised Cardiac Risk Index (RCRI) for Pre-Operative Risk:  1 point , class II risk 6.0 % 30-day risk of death, MI, or cardiac arrest   [email or web address]/[email or web address]-[email or web address]-pre-[email or web address]  Vitals and labs, immaging , EKG  reviewed  Patient may take all  meds on the day of the surgery with a sip of water prior surgery , NPO from midnight  Patient medically optimized for surgery and there is no absolute contraindications for needed surgery    Bronchiectasis - albuterol prn, c/w prophylactic dose of azithromycin , CXR - clear     HTN - c/w BB, CCB and ARB , monitor BP    HLD - hold statins due to transaminitis    GERD - c/w PPI    Glaucoma of left eye - c/w drops        DVT PPX: as per surgery    Advance Directive:  - Full code, diagnosis, prognosis discussed  - 17 minutes spend    Disposition: as per surgery    Time Span: 75 min    Thank you for consult, will follow with you.

## 2022-08-25 NOTE — PROGRESS NOTE ADULT - ASSESSMENT
78 yo female with RUQ pain, WBC elevated, US suggestive of acute cholecystitis.    Plan:    NPO  Pain control  Nausea contrl  IV antibiotics  IVF  HIDA am  Medical clearance    Case discussed with Dr. Aldana 76 yo female with RUQ pain, WBC elevated, US suggestive of acute cholecystitis.    Plan:    CLD, NPO after MN  Pain control  Nausea contrl  IV antibiotics  IVF  HIDA scan positive  plan for lap cholecystectomy tomorrow  Medical clearance pending    Case discussed with Dr. Aldana

## 2022-08-26 ENCOUNTER — RESULT REVIEW (OUTPATIENT)
Age: 77
End: 2022-08-26

## 2022-08-26 ENCOUNTER — TRANSCRIPTION ENCOUNTER (OUTPATIENT)
Age: 77
End: 2022-08-26

## 2022-08-26 LAB
ALBUMIN SERPL ELPH-MCNC: 2.9 G/DL — LOW (ref 3.3–5)
ALP SERPL-CCNC: 82 U/L — SIGNIFICANT CHANGE UP (ref 40–120)
ALT FLD-CCNC: 57 U/L — SIGNIFICANT CHANGE UP (ref 12–78)
ANION GAP SERPL CALC-SCNC: 7 MMOL/L — SIGNIFICANT CHANGE UP (ref 5–17)
APTT BLD: 29.6 SEC — SIGNIFICANT CHANGE UP (ref 27.5–35.5)
AST SERPL-CCNC: 78 U/L — HIGH (ref 15–37)
BASOPHILS # BLD AUTO: 0.04 K/UL — SIGNIFICANT CHANGE UP (ref 0–0.2)
BASOPHILS NFR BLD AUTO: 0.3 % — SIGNIFICANT CHANGE UP (ref 0–2)
BILIRUB SERPL-MCNC: 0.9 MG/DL — SIGNIFICANT CHANGE UP (ref 0.2–1.2)
BUN SERPL-MCNC: 8 MG/DL — SIGNIFICANT CHANGE UP (ref 7–23)
CALCIUM SERPL-MCNC: 8.3 MG/DL — LOW (ref 8.5–10.1)
CHLORIDE SERPL-SCNC: 109 MMOL/L — HIGH (ref 96–108)
CO2 SERPL-SCNC: 24 MMOL/L — SIGNIFICANT CHANGE UP (ref 22–31)
CREAT SERPL-MCNC: 0.63 MG/DL — SIGNIFICANT CHANGE UP (ref 0.5–1.3)
EGFR: 91 ML/MIN/1.73M2 — SIGNIFICANT CHANGE UP
EOSINOPHIL # BLD AUTO: 0.09 K/UL — SIGNIFICANT CHANGE UP (ref 0–0.5)
EOSINOPHIL NFR BLD AUTO: 0.6 % — SIGNIFICANT CHANGE UP (ref 0–6)
GLUCOSE SERPL-MCNC: 132 MG/DL — HIGH (ref 70–99)
HCT VFR BLD CALC: 38.6 % — SIGNIFICANT CHANGE UP (ref 34.5–45)
HGB BLD-MCNC: 12.7 G/DL — SIGNIFICANT CHANGE UP (ref 11.5–15.5)
IMM GRANULOCYTES NFR BLD AUTO: 0.3 % — SIGNIFICANT CHANGE UP (ref 0–1.5)
INR BLD: 1.31 RATIO — HIGH (ref 0.88–1.16)
LYMPHOCYTES # BLD AUTO: 23.7 % — SIGNIFICANT CHANGE UP (ref 13–44)
LYMPHOCYTES # BLD AUTO: 3.59 K/UL — HIGH (ref 1–3.3)
MAGNESIUM SERPL-MCNC: 1.8 MG/DL — SIGNIFICANT CHANGE UP (ref 1.6–2.6)
MCHC RBC-ENTMCNC: 31.8 PG — SIGNIFICANT CHANGE UP (ref 27–34)
MCHC RBC-ENTMCNC: 32.9 GM/DL — SIGNIFICANT CHANGE UP (ref 32–36)
MCV RBC AUTO: 96.5 FL — SIGNIFICANT CHANGE UP (ref 80–100)
MONOCYTES # BLD AUTO: 1.34 K/UL — HIGH (ref 0–0.9)
MONOCYTES NFR BLD AUTO: 8.8 % — SIGNIFICANT CHANGE UP (ref 2–14)
NEUTROPHILS # BLD AUTO: 10.04 K/UL — HIGH (ref 1.8–7.4)
NEUTROPHILS NFR BLD AUTO: 66.3 % — SIGNIFICANT CHANGE UP (ref 43–77)
NT-PROBNP SERPL-SCNC: 151 PG/ML — SIGNIFICANT CHANGE UP (ref 0–450)
PHOSPHATE SERPL-MCNC: 1.6 MG/DL — LOW (ref 2.5–4.5)
PLATELET # BLD AUTO: 171 K/UL — SIGNIFICANT CHANGE UP (ref 150–400)
POTASSIUM SERPL-MCNC: 3.4 MMOL/L — LOW (ref 3.5–5.3)
POTASSIUM SERPL-SCNC: 3.4 MMOL/L — LOW (ref 3.5–5.3)
PROT SERPL-MCNC: 6.2 GM/DL — SIGNIFICANT CHANGE UP (ref 6–8.3)
PROTHROM AB SERPL-ACNC: 15.2 SEC — HIGH (ref 10.5–13.4)
RBC # BLD: 4 M/UL — SIGNIFICANT CHANGE UP (ref 3.8–5.2)
RBC # FLD: 12.3 % — SIGNIFICANT CHANGE UP (ref 10.3–14.5)
SODIUM SERPL-SCNC: 140 MMOL/L — SIGNIFICANT CHANGE UP (ref 135–145)
WBC # BLD: 15.15 K/UL — HIGH (ref 3.8–10.5)
WBC # FLD AUTO: 15.15 K/UL — HIGH (ref 3.8–10.5)

## 2022-08-26 PROCEDURE — 99232 SBSQ HOSP IP/OBS MODERATE 35: CPT

## 2022-08-26 PROCEDURE — 88304 TISSUE EXAM BY PATHOLOGIST: CPT | Mod: 26

## 2022-08-26 PROCEDURE — 71045 X-RAY EXAM CHEST 1 VIEW: CPT | Mod: 26

## 2022-08-26 RX ORDER — ACETAMINOPHEN 500 MG
1000 TABLET ORAL ONCE
Refills: 0 | Status: COMPLETED | OUTPATIENT
Start: 2022-08-26 | End: 2022-08-26

## 2022-08-26 RX ORDER — POTASSIUM CHLORIDE 20 MEQ
40 PACKET (EA) ORAL ONCE
Refills: 0 | Status: COMPLETED | OUTPATIENT
Start: 2022-08-26 | End: 2022-08-26

## 2022-08-26 RX ORDER — OXYCODONE HYDROCHLORIDE 5 MG/1
5 TABLET ORAL ONCE
Refills: 0 | Status: DISCONTINUED | OUTPATIENT
Start: 2022-08-26 | End: 2022-08-26

## 2022-08-26 RX ORDER — ALBUTEROL 90 UG/1
2 AEROSOL, METERED ORAL EVERY 6 HOURS
Refills: 0 | Status: DISCONTINUED | OUTPATIENT
Start: 2022-08-26 | End: 2022-08-28

## 2022-08-26 RX ORDER — POTASSIUM PHOSPHATE, MONOBASIC POTASSIUM PHOSPHATE, DIBASIC 236; 224 MG/ML; MG/ML
30 INJECTION, SOLUTION INTRAVENOUS ONCE
Refills: 0 | Status: COMPLETED | OUTPATIENT
Start: 2022-08-26 | End: 2022-08-27

## 2022-08-26 RX ORDER — IPRATROPIUM/ALBUTEROL SULFATE 18-103MCG
3 AEROSOL WITH ADAPTER (GRAM) INHALATION ONCE
Refills: 0 | Status: DISCONTINUED | OUTPATIENT
Start: 2022-08-26 | End: 2022-08-29

## 2022-08-26 RX ORDER — SODIUM CHLORIDE 9 MG/ML
1000 INJECTION, SOLUTION INTRAVENOUS
Refills: 0 | Status: DISCONTINUED | OUTPATIENT
Start: 2022-08-26 | End: 2022-08-26

## 2022-08-26 RX ORDER — MAGNESIUM SULFATE 500 MG/ML
1 VIAL (ML) INJECTION ONCE
Refills: 0 | Status: COMPLETED | OUTPATIENT
Start: 2022-08-26 | End: 2022-08-26

## 2022-08-26 RX ORDER — FUROSEMIDE 40 MG
10 TABLET ORAL ONCE
Refills: 0 | Status: COMPLETED | OUTPATIENT
Start: 2022-08-26 | End: 2022-08-26

## 2022-08-26 RX ORDER — ONDANSETRON 8 MG/1
4 TABLET, FILM COATED ORAL ONCE
Refills: 0 | Status: DISCONTINUED | OUTPATIENT
Start: 2022-08-26 | End: 2022-08-26

## 2022-08-26 RX ORDER — FENTANYL CITRATE 50 UG/ML
50 INJECTION INTRAVENOUS
Refills: 0 | Status: DISCONTINUED | OUTPATIENT
Start: 2022-08-26 | End: 2022-08-26

## 2022-08-26 RX ADMIN — Medication 10 MILLIGRAM(S): at 19:23

## 2022-08-26 RX ADMIN — Medication 40 MILLIEQUIVALENT(S): at 10:45

## 2022-08-26 RX ADMIN — Medication 400 MILLIGRAM(S): at 21:28

## 2022-08-26 RX ADMIN — ALBUTEROL 2 PUFF(S): 90 AEROSOL, METERED ORAL at 19:22

## 2022-08-26 RX ADMIN — FENTANYL CITRATE 50 MICROGRAM(S): 50 INJECTION INTRAVENOUS at 17:43

## 2022-08-26 RX ADMIN — Medication 100 MILLIGRAM(S): at 06:17

## 2022-08-26 RX ADMIN — LOSARTAN POTASSIUM 50 MILLIGRAM(S): 100 TABLET, FILM COATED ORAL at 09:06

## 2022-08-26 RX ADMIN — Medication 100 GRAM(S): at 10:45

## 2022-08-26 RX ADMIN — ENOXAPARIN SODIUM 40 MILLIGRAM(S): 100 INJECTION SUBCUTANEOUS at 20:59

## 2022-08-26 RX ADMIN — Medication 400 MILLIGRAM(S): at 05:15

## 2022-08-26 RX ADMIN — BRIMONIDINE TARTRATE 1 DROP(S): 2 SOLUTION/ DROPS OPHTHALMIC at 09:12

## 2022-08-26 RX ADMIN — Medication 200 MILLIGRAM(S): at 09:05

## 2022-08-26 RX ADMIN — NEBIVOLOL HYDROCHLORIDE 10 MILLIGRAM(S): 5 TABLET ORAL at 09:06

## 2022-08-26 RX ADMIN — BRIMONIDINE TARTRATE 1 DROP(S): 2 SOLUTION/ DROPS OPHTHALMIC at 20:59

## 2022-08-26 RX ADMIN — FENTANYL CITRATE 50 MICROGRAM(S): 50 INJECTION INTRAVENOUS at 16:45

## 2022-08-26 RX ADMIN — DESVENLAFAXINE 25 MILLIGRAM(S): 50 TABLET, EXTENDED RELEASE ORAL at 20:59

## 2022-08-26 RX ADMIN — FENTANYL CITRATE 50 MICROGRAM(S): 50 INJECTION INTRAVENOUS at 17:00

## 2022-08-26 RX ADMIN — Medication 1000 MILLIGRAM(S): at 05:45

## 2022-08-26 RX ADMIN — SENNA PLUS 2 TABLET(S): 8.6 TABLET ORAL at 20:58

## 2022-08-26 RX ADMIN — AMLODIPINE BESYLATE 5 MILLIGRAM(S): 2.5 TABLET ORAL at 20:58

## 2022-08-26 RX ADMIN — OXYCODONE HYDROCHLORIDE 5 MILLIGRAM(S): 5 TABLET ORAL at 17:36

## 2022-08-26 RX ADMIN — Medication 200 MILLIGRAM(S): at 23:09

## 2022-08-26 RX ADMIN — Medication 100 MILLIGRAM(S): at 21:53

## 2022-08-26 RX ADMIN — AZITHROMYCIN 250 MILLIGRAM(S): 500 TABLET, FILM COATED ORAL at 09:06

## 2022-08-26 RX ADMIN — SODIUM CHLORIDE 100 MILLILITER(S): 9 INJECTION INTRAMUSCULAR; INTRAVENOUS; SUBCUTANEOUS at 05:15

## 2022-08-26 RX ADMIN — SODIUM CHLORIDE 100 MILLILITER(S): 9 INJECTION INTRAMUSCULAR; INTRAVENOUS; SUBCUTANEOUS at 23:27

## 2022-08-26 RX ADMIN — Medication 400 MILLIGRAM(S): at 12:39

## 2022-08-26 RX ADMIN — Medication 1000 MILLIGRAM(S): at 21:51

## 2022-08-26 NOTE — PROGRESS NOTE ADULT - ASSESSMENT
78 yo female with RUQ pain, WBC elevated, US suggestive of acute cholecystitis.    Plan:    NPO after MN  Pain control  Nausea contrl  IV antibiotics  IVF hydration  HIDA scan positive  plan for lap cholecystectomy today  Medical clearance reviewed    Case discussed with Dr. Aldana

## 2022-08-26 NOTE — PROGRESS NOTE ADULT - ASSESSMENT
76 y/o female with PMHx of bronchiectasis, HTN, HLD , OP, GERD, Anxiety, Cholelithiasis admitted to surgery service with  c/o worsening epigastric pain radiating to back associated with nausea and vomiting started  night before eating. No fevers, cp, sob. No prior hx of abdominal surgeries.      Assessment/Plan:    RUQ abdominal pain , nausea, vomiting due to acute cholecystitis, cholelithiasis  - as per surgery   - c/w IV fluids  - IV cipro and flagyl    Pre-Surgical Evaluation medical  Clearance for surgery  Clinical cardiac predictor(s):  pulmonary disease, HTN   Surgical risk level:  High  Functional Status:  GOOD   Revised Cardiac Risk Index (RCRI) for Pre-Operative Risk:  1 point , class II risk 6.0 % 30-day risk of death, MI, or cardiac arrest   [email or web address]/[email or web address]-[email or web address]-gejp-index-pre-[email or web address]  Vitals and labs, immaging , EKG  reviewed  Patient may take all  meds on the day of the surgery with a sip of water prior surgery , NPO from midnight  Patient medically optimized for surgery and there is no absolute contraindications for needed surgery    Hypokalemia, Hypophosphatemia, hypomagnesemia  - replace, monitor    Bronchiectasis - albuterol prn, c/w prophylactic dose of azithromycin , CXR - clear     HTN - c/w BB, CCB and ARB , monitor BP    HLD - hold statins due to transaminitis    GERD - c/w PPI    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

## 2022-08-27 ENCOUNTER — TRANSCRIPTION ENCOUNTER (OUTPATIENT)
Age: 77
End: 2022-08-27

## 2022-08-27 LAB
ALBUMIN SERPL ELPH-MCNC: 2.4 G/DL — LOW (ref 3.3–5)
ALP SERPL-CCNC: 76 U/L — SIGNIFICANT CHANGE UP (ref 40–120)
ALT FLD-CCNC: 61 U/L — SIGNIFICANT CHANGE UP (ref 12–78)
ANION GAP SERPL CALC-SCNC: 6 MMOL/L — SIGNIFICANT CHANGE UP (ref 5–17)
AST SERPL-CCNC: 87 U/L — HIGH (ref 15–37)
BASOPHILS # BLD AUTO: 0.01 K/UL — SIGNIFICANT CHANGE UP (ref 0–0.2)
BASOPHILS NFR BLD AUTO: 0.1 % — SIGNIFICANT CHANGE UP (ref 0–2)
BILIRUB SERPL-MCNC: 0.4 MG/DL — SIGNIFICANT CHANGE UP (ref 0.2–1.2)
BUN SERPL-MCNC: 10 MG/DL — SIGNIFICANT CHANGE UP (ref 7–23)
CALCIUM SERPL-MCNC: 7.6 MG/DL — LOW (ref 8.5–10.1)
CHLORIDE SERPL-SCNC: 111 MMOL/L — HIGH (ref 96–108)
CO2 SERPL-SCNC: 24 MMOL/L — SIGNIFICANT CHANGE UP (ref 22–31)
CREAT SERPL-MCNC: 0.88 MG/DL — SIGNIFICANT CHANGE UP (ref 0.5–1.3)
EGFR: 67 ML/MIN/1.73M2 — SIGNIFICANT CHANGE UP
EOSINOPHIL # BLD AUTO: 0 K/UL — SIGNIFICANT CHANGE UP (ref 0–0.5)
EOSINOPHIL NFR BLD AUTO: 0 % — SIGNIFICANT CHANGE UP (ref 0–6)
GLUCOSE SERPL-MCNC: 159 MG/DL — HIGH (ref 70–99)
HCT VFR BLD CALC: 36.5 % — SIGNIFICANT CHANGE UP (ref 34.5–45)
HGB BLD-MCNC: 11.8 G/DL — SIGNIFICANT CHANGE UP (ref 11.5–15.5)
IMM GRANULOCYTES NFR BLD AUTO: 0.5 % — SIGNIFICANT CHANGE UP (ref 0–1.5)
LYMPHOCYTES # BLD AUTO: 17.3 % — SIGNIFICANT CHANGE UP (ref 13–44)
LYMPHOCYTES # BLD AUTO: 2.68 K/UL — SIGNIFICANT CHANGE UP (ref 1–3.3)
MAGNESIUM SERPL-MCNC: 2 MG/DL — SIGNIFICANT CHANGE UP (ref 1.6–2.6)
MCHC RBC-ENTMCNC: 32 PG — SIGNIFICANT CHANGE UP (ref 27–34)
MCHC RBC-ENTMCNC: 32.3 GM/DL — SIGNIFICANT CHANGE UP (ref 32–36)
MCV RBC AUTO: 98.9 FL — SIGNIFICANT CHANGE UP (ref 80–100)
MONOCYTES # BLD AUTO: 1.1 K/UL — HIGH (ref 0–0.9)
MONOCYTES NFR BLD AUTO: 7.1 % — SIGNIFICANT CHANGE UP (ref 2–14)
NEUTROPHILS # BLD AUTO: 11.6 K/UL — HIGH (ref 1.8–7.4)
NEUTROPHILS NFR BLD AUTO: 75 % — SIGNIFICANT CHANGE UP (ref 43–77)
PHOSPHATE SERPL-MCNC: 4.3 MG/DL — SIGNIFICANT CHANGE UP (ref 2.5–4.5)
PLATELET # BLD AUTO: 158 K/UL — SIGNIFICANT CHANGE UP (ref 150–400)
POTASSIUM SERPL-MCNC: 5.2 MMOL/L — SIGNIFICANT CHANGE UP (ref 3.5–5.3)
POTASSIUM SERPL-SCNC: 5.2 MMOL/L — SIGNIFICANT CHANGE UP (ref 3.5–5.3)
PROT SERPL-MCNC: 5.8 GM/DL — LOW (ref 6–8.3)
RBC # BLD: 3.69 M/UL — LOW (ref 3.8–5.2)
RBC # FLD: 12.9 % — SIGNIFICANT CHANGE UP (ref 10.3–14.5)
SODIUM SERPL-SCNC: 141 MMOL/L — SIGNIFICANT CHANGE UP (ref 135–145)
WBC # BLD: 15.47 K/UL — HIGH (ref 3.8–10.5)
WBC # FLD AUTO: 15.47 K/UL — HIGH (ref 3.8–10.5)

## 2022-08-27 PROCEDURE — 99232 SBSQ HOSP IP/OBS MODERATE 35: CPT

## 2022-08-27 RX ORDER — FUROSEMIDE 40 MG
20 TABLET ORAL ONCE
Refills: 0 | Status: COMPLETED | OUTPATIENT
Start: 2022-08-27 | End: 2022-08-27

## 2022-08-27 RX ORDER — AZITHROMYCIN 500 MG/1
1 TABLET, FILM COATED ORAL
Qty: 0 | Refills: 0 | DISCHARGE

## 2022-08-27 RX ORDER — ACETAMINOPHEN 500 MG
650 TABLET ORAL EVERY 6 HOURS
Refills: 0 | Status: DISCONTINUED | OUTPATIENT
Start: 2022-08-27 | End: 2022-08-29

## 2022-08-27 RX ORDER — ACETAMINOPHEN 500 MG
1000 TABLET ORAL ONCE
Refills: 0 | Status: COMPLETED | OUTPATIENT
Start: 2022-08-27 | End: 2022-08-27

## 2022-08-27 RX ORDER — SODIUM CHLORIDE 9 MG/ML
1000 INJECTION INTRAMUSCULAR; INTRAVENOUS; SUBCUTANEOUS ONCE
Refills: 0 | Status: COMPLETED | OUTPATIENT
Start: 2022-08-27 | End: 2022-08-27

## 2022-08-27 RX ADMIN — SODIUM CHLORIDE 1000 MILLILITER(S): 9 INJECTION INTRAMUSCULAR; INTRAVENOUS; SUBCUTANEOUS at 06:37

## 2022-08-27 RX ADMIN — ENOXAPARIN SODIUM 40 MILLIGRAM(S): 100 INJECTION SUBCUTANEOUS at 21:57

## 2022-08-27 RX ADMIN — LOSARTAN POTASSIUM 50 MILLIGRAM(S): 100 TABLET, FILM COATED ORAL at 10:41

## 2022-08-27 RX ADMIN — NEBIVOLOL HYDROCHLORIDE 10 MILLIGRAM(S): 5 TABLET ORAL at 10:42

## 2022-08-27 RX ADMIN — Medication 650 MILLIGRAM(S): at 21:58

## 2022-08-27 RX ADMIN — Medication 1000 MILLIGRAM(S): at 05:15

## 2022-08-27 RX ADMIN — DESVENLAFAXINE 25 MILLIGRAM(S): 50 TABLET, EXTENDED RELEASE ORAL at 10:41

## 2022-08-27 RX ADMIN — POTASSIUM PHOSPHATE, MONOBASIC POTASSIUM PHOSPHATE, DIBASIC 83.33 MILLIMOLE(S): 236; 224 INJECTION, SOLUTION INTRAVENOUS at 00:12

## 2022-08-27 RX ADMIN — BRIMONIDINE TARTRATE 1 DROP(S): 2 SOLUTION/ DROPS OPHTHALMIC at 11:03

## 2022-08-27 RX ADMIN — Medication 200 MILLIGRAM(S): at 20:27

## 2022-08-27 RX ADMIN — AMLODIPINE BESYLATE 5 MILLIGRAM(S): 2.5 TABLET ORAL at 21:57

## 2022-08-27 RX ADMIN — BRIMONIDINE TARTRATE 1 DROP(S): 2 SOLUTION/ DROPS OPHTHALMIC at 20:26

## 2022-08-27 RX ADMIN — Medication 100 MILLIGRAM(S): at 04:57

## 2022-08-27 RX ADMIN — ALBUTEROL 2 PUFF(S): 90 AEROSOL, METERED ORAL at 12:46

## 2022-08-27 RX ADMIN — Medication 20 MILLIGRAM(S): at 15:14

## 2022-08-27 RX ADMIN — Medication 100 MILLIGRAM(S): at 15:33

## 2022-08-27 RX ADMIN — Medication 650 MILLIGRAM(S): at 23:00

## 2022-08-27 RX ADMIN — Medication 200 MILLIGRAM(S): at 10:40

## 2022-08-27 RX ADMIN — Medication 650 MILLIGRAM(S): at 15:33

## 2022-08-27 RX ADMIN — Medication 100 MILLIGRAM(S): at 22:00

## 2022-08-27 RX ADMIN — Medication 40 MILLIGRAM(S): at 15:14

## 2022-08-27 RX ADMIN — Medication 400 MILLIGRAM(S): at 04:15

## 2022-08-27 RX ADMIN — Medication 650 MILLIGRAM(S): at 16:07

## 2022-08-27 NOTE — DISCHARGE NOTE PROVIDER - NSDCFUADDINST_GEN_ALL_CORE_FT
PAIN: You may continue to take Acetaminophen (Tylenol) and Ibuprofen (Advil, Motrin) over the counter for pain.   BATHING: Please do not soak or submerge the wound in water (bath, swimming) for 14 days after your surgery.  ACTIVITY: No heavy lifting, straining, or vigorous activity until your follow-up appointment in 1-2 weeks.   NOTIFY US IF: You have any bleeding that does not stop, any pus draining from his/her wound(s), any fever (over 100.4 F) or chills, persistent nausea/vomiting, persistent diarrhea, or if your pain is not controlled on their discharge pain medications.  FOLLOW-UP: Please call the office and make an appointment to follow up with Dr Aldana in 1-2 weeks.  Please follow up with your primary care physician in 1-2 weeks regarding your hospitalization. PAIN: You may continue to take Acetaminophen (Tylenol) and Ibuprofen (Advil, Motrin) over the counter for pain.   WOUND: You may shower normally. You have skin glue over your incision sites, you can let soap and water run over incision sites, no need to scrub.  Glue will come off on its own after several showers.  You do not have any stitches in place that need to be removed.   BATHING: Please do not soak or submerge the wound in water (bath, swimming) for 14 days after your surgery.  ACTIVITY: No heavy lifting, straining, or vigorous activity until your follow-up appointment in 1-2 weeks.   NOTIFY US IF: You have any bleeding that does not stop, any pus draining from his/her wound(s), any fever (over 100.4 F) or chills, persistent nausea/vomiting, persistent diarrhea, or if your pain is not controlled on their discharge pain medications.  FOLLOW-UP: Please call the office and make an appointment to follow up with Dr Aldana in 1-2 weeks.  Please follow up with your primary care physician in 1-2 weeks regarding your hospitalization. Follow up with pulmonologist outpatient.

## 2022-08-27 NOTE — PROGRESS NOTE ADULT - ASSESSMENT
78 yo female with RUQ pain, WBC elevated, US suggestive of acute cholecystitis.  PT is s/p laparoscopic cholecystectomy POD 1. WBC is elevated.     Plan:    Low fat diet  Pain control  Nausea control  IV antibiotics  IVF hydration  Possible DC today    Case discussed with Dr. Aldana

## 2022-08-27 NOTE — PROVIDER CONTACT NOTE (OTHER) - SITUATION
pt post op day 1, urine dark this AM and only 125cc,   also voided x1 last night, missed hat but was small amount.
Pt has lovenox due at 2200, pt going for lap jessica 8/26

## 2022-08-27 NOTE — DISCHARGE NOTE PROVIDER - PROVIDER TOKENS
PROVIDER:[TOKEN:[[id number]],FOLLOWUP:[2 weeks]] PROVIDER:[TOKEN:[[id number]],FOLLOWUP:[2 weeks]],PROVIDER:[TOKEN:[[id number]],FOLLOWUP:[2 weeks]]

## 2022-08-27 NOTE — DISCHARGE NOTE PROVIDER - CARE PROVIDERS DIRECT ADDRESSES
,DirectAddress_Unknown ,DirectAddress_Unknown,[email or web address]

## 2022-08-27 NOTE — DISCHARGE NOTE PROVIDER - HOSPITAL COURSE
Pt diagnosed with acute cholecystitis. Pt underwent laparoscopic cholecystectomy without complication. Pt is ready for safe discharge   Pt diagnosed with acute cholecystitis. Pt underwent laparoscopic cholecystectomy.  Post op course complicated by hypoxia, likely secondary to fluid overload.  Hospitalist consulted, ordered lasix. CT chest performed showing atelectasis, small B/L pleural effusions and possible PNA.  Pulmonology consulted, started azithromycin.  Patient now stable for discharge on oral Ceftin.

## 2022-08-27 NOTE — DISCHARGE NOTE PROVIDER - NSDCMRMEDTOKEN_GEN_ALL_CORE_FT
Alphagan P 0.1% ophthalmic solution: 1 drop(s) in the left eye 2 times a day  amLODIPine 5 mg oral tablet: 1 tab(s) orally once a day (at bedtime)  CoQ10 100 mg oral capsule: 1 cap(s) orally once a day  desvenlafaxine (as succinate) 25 mg oral tablet, extended release: 1 tab(s) orally once a day (at bedtime)  Lutein 6 mg oral capsule: 1 cap(s) orally once a day  Multiple Vitamins oral tablet: 1 tab(s) orally once a day  Nebivolol 10 mg oral tablet: 1 tab(s) orally once a day  olmesartan 20 mg oral tablet: 1 tab(s) orally once a day  Omega-3 Fish Oil 1000 mg oral capsule: 1 cap(s) orally once a day  rosuvastatin 5 mg oral tablet: 1 tab(s) orally once a day (at bedtime)  Vitamin D3 50 mcg (2000 intl units) oral tablet: 1 tab(s) orally once a day   Alphagan P 0.1% ophthalmic solution: 1 drop(s) in the left eye 2 times a day  azithromycin 250 mg oral tablet: 1 tab(s) orally Monday, Wednesday, and Friday  cefuroxime 500 mg oral tablet: 1 tab(s) orally every 12 hours  CoQ10 100 mg oral capsule: 1 cap(s) orally once a day  desvenlafaxine (as succinate) 25 mg oral tablet, extended release: 1 tab(s) orally once a day (at bedtime)  Lutein 6 mg oral capsule: 1 cap(s) orally once a day  Multiple Vitamins oral tablet: 1 tab(s) orally once a day  Nebivolol 10 mg oral tablet: 1 tab(s) orally once a day  olmesartan 20 mg oral tablet: 1 tab(s) orally once a day  Omega-3 Fish Oil 1000 mg oral capsule: 1 cap(s) orally once a day  potassium phosphate-sodium phosphate 305 mg-700 mg oral tablet: 1 tab(s) orally 3 times a day  ProAir HFA 90 mcg/inh inhalation aerosol: 2 puff(s) inhaled every 4 hours, As Needed -for bronchospasm   Vitamin D3 50 mcg (2000 intl units) oral tablet: 1 tab(s) orally once a day   Alphagan P 0.1% ophthalmic solution: 1 drop(s) in the left eye 2 times a day  azithromycin 250 mg oral tablet: 1 tab(s) orally Monday, Wednesday, and Friday  cefuroxime 500 mg oral tablet: 1 tab(s) orally every 12 hours  CoQ10 100 mg oral capsule: 1 cap(s) orally once a day  desvenlafaxine (as succinate) 25 mg oral tablet, extended release: 1 tab(s) orally once a day (at bedtime)  Lutein 6 mg oral capsule: 1 cap(s) orally once a day  Multiple Vitamins oral tablet: 1 tab(s) orally once a day  Nebivolol 10 mg oral tablet: 1 tab(s) orally once a day  olmesartan 20 mg oral tablet: 1 tab(s) orally once a day  Omega-3 Fish Oil 1000 mg oral capsule: 1 cap(s) orally once a day  oxycodone-acetaminophen 5 mg-325 mg oral tablet: 1-2 tab(s) orally every 4-6 hours, As Needed -for moderate pain MDD:4gm  potassium phosphate-sodium phosphate 305 mg-700 mg oral tablet: 1 tab(s) orally 3 times a day  ProAir HFA 90 mcg/inh inhalation aerosol: 2 puff(s) inhaled every 4 hours, As Needed -for bronchospasm   Vitamin D3 50 mcg (2000 intl units) oral tablet: 1 tab(s) orally once a day

## 2022-08-27 NOTE — PROGRESS NOTE ADULT - ASSESSMENT
78 y/o female with PMHx of bronchiectasis, HTN, HLD , OP, GERD, Anxiety, Cholelithiasis admitted to surgery service with  c/o worsening epigastric pain radiating to back associated with nausea and vomiting started  night before eating. No fevers, cp, sob. No prior hx of abdominal surgeries.      Assessment/Plan:    RUQ abdominal pain , nausea, vomiting due to acute cholecystitis, cholelithiasis  s/p cholecystectomy   - as per surgery   - c/w IV fluids  - IV cipro and flagyl  - IS, ambulation     Bronchiectasis with bronchospasm after surgery  Mild fluid overload s/p IV fluids  - stop IV fluids, IV lasix 20   - IV steroids 1 dose  - CXR - noted  8/26, repeat CXR in am  - - albuterol prn,  - c/w prophylactic dose of azithromycin      Hypokalemia, Hypophosphatemia, hypomagnesemia, resolved   - replace, monitor    HTN - c/w BB, CCB and ARB , monitor BP    HLD - hold statins due to transaminitis    GERD - c/w PPI    Glaucoma of left eye - c/w drops    DVT PPX: as per surgery    Advance Directive:  - Full code, diagnosis, prognosis discussed    Disposition: as per surgery    Dispo - monitor inpatient       Thank you for consult, will follow with you.

## 2022-08-27 NOTE — DISCHARGE NOTE PROVIDER - CARE PROVIDER_API CALL
Shiraz Aldana)  Surgery  [address], [address]  [place] [address]  Phone: [phone]  Fax: [phone]  Follow Up Time: 2 weeks   Shiraz Aldana)  Surgery  [address] [facility] [facility], [address]  [place] [address]  Phone: [phone]  Fax: [phone]  Follow Up Time: 2 weeks    Waldemar Wilkes)  Critical Care Medicine; Internal Medicine; Pulmonary Disease  [address] [place] [address]  Phone: [phone]  Fax: [phone]  Follow Up Time: 2 weeks

## 2022-08-28 PROCEDURE — 71275 CT ANGIOGRAPHY CHEST: CPT | Mod: 26

## 2022-08-28 PROCEDURE — 99232 SBSQ HOSP IP/OBS MODERATE 35: CPT

## 2022-08-28 PROCEDURE — 71045 X-RAY EXAM CHEST 1 VIEW: CPT | Mod: 26

## 2022-08-28 RX ORDER — FUROSEMIDE 40 MG
20 TABLET ORAL DAILY
Refills: 0 | Status: DISCONTINUED | OUTPATIENT
Start: 2022-08-28 | End: 2022-08-29

## 2022-08-28 RX ORDER — ALBUTEROL 90 UG/1
2 AEROSOL, METERED ORAL EVERY 6 HOURS
Refills: 0 | Status: DISCONTINUED | OUTPATIENT
Start: 2022-08-28 | End: 2022-08-29

## 2022-08-28 RX ADMIN — Medication 650 MILLIGRAM(S): at 06:34

## 2022-08-28 RX ADMIN — Medication 200 MILLIGRAM(S): at 10:16

## 2022-08-28 RX ADMIN — SENNA PLUS 2 TABLET(S): 8.6 TABLET ORAL at 20:59

## 2022-08-28 RX ADMIN — Medication 650 MILLIGRAM(S): at 20:30

## 2022-08-28 RX ADMIN — BRIMONIDINE TARTRATE 1 DROP(S): 2 SOLUTION/ DROPS OPHTHALMIC at 20:50

## 2022-08-28 RX ADMIN — ALBUTEROL 2 PUFF(S): 90 AEROSOL, METERED ORAL at 20:23

## 2022-08-28 RX ADMIN — Medication 650 MILLIGRAM(S): at 05:20

## 2022-08-28 RX ADMIN — Medication 200 MILLIGRAM(S): at 20:50

## 2022-08-28 RX ADMIN — DESVENLAFAXINE 25 MILLIGRAM(S): 50 TABLET, EXTENDED RELEASE ORAL at 10:16

## 2022-08-28 RX ADMIN — ENOXAPARIN SODIUM 40 MILLIGRAM(S): 100 INJECTION SUBCUTANEOUS at 20:59

## 2022-08-28 RX ADMIN — BRIMONIDINE TARTRATE 1 DROP(S): 2 SOLUTION/ DROPS OPHTHALMIC at 10:17

## 2022-08-28 RX ADMIN — Medication 650 MILLIGRAM(S): at 21:30

## 2022-08-28 RX ADMIN — Medication 100 MILLIGRAM(S): at 13:11

## 2022-08-28 RX ADMIN — Medication 20 MILLIGRAM(S): at 13:32

## 2022-08-28 RX ADMIN — NEBIVOLOL HYDROCHLORIDE 10 MILLIGRAM(S): 5 TABLET ORAL at 10:16

## 2022-08-28 RX ADMIN — Medication 100 MILLIGRAM(S): at 21:58

## 2022-08-28 RX ADMIN — Medication 100 MILLIGRAM(S): at 05:47

## 2022-08-28 RX ADMIN — Medication 650 MILLIGRAM(S): at 05:48

## 2022-08-28 RX ADMIN — LOSARTAN POTASSIUM 50 MILLIGRAM(S): 100 TABLET, FILM COATED ORAL at 10:16

## 2022-08-28 NOTE — PROGRESS NOTE ADULT - ASSESSMENT
76 y/o female with PMHx of bronchiectasis, HTN, HLD , OP, GERD, Anxiety, Cholelithiasis admitted to surgery service with  c/o worsening epigastric pain radiating to back associated with nausea and vomiting started  night before eating. No fevers, cp, sob. No prior hx of abdominal surgeries.      Assessment/Plan:    RUQ abdominal pain , nausea, vomiting due to acute cholecystitis, cholelithiasis  s/p cholecystectomy   - as per surgery   - c/w IV fluids  - IV cipro and flagyl  - IS, ambulation     Bronchiectasis with bronchospasm after surgery  Mild fluid overload s/p IV fluids   Acute hypoxic respiratory failure multifactorial, bronchospasm, bilateral pleural effusion, atelectasis, possible LLL PNA vs atelectasis  - stop IV fluids, c/w IV lasix 20 ( stop amlodipine and losartan to allow higher BP )   -8/27 s/p  IV steroids 1 dose  - CXR - noted  8/26, repeat CXR - bilateral effusions  - - albuterol, chest pt   - c/w prophylactic dose of azithromycin   - pulmonary consult   - O2 at test 86%      Hypokalemia, Hypophosphatemia, hypomagnesemia, resolved   - replace, monitor    HTN - c/w BB,  hold CCB and ARB , monitor BP    HLD - hold statins due to transaminitis    GERD - c/w PPI    Glaucoma of left eye - c/w drops    DVT PPX: as per surgery    Advance Directive:  - Full code, diagnosis, prognosis discussed    Disposition: as per surgery    Dispo - monitor inpatient , IV lasix, stop BP meds      Thank you for consult, will follow with you.

## 2022-08-28 NOTE — CONSULT NOTE ADULT - SUBJECTIVE AND OBJECTIVE BOX
HPI: JOHNNY VERDUGO is a 77y old Female with PMH bronchiectasis, HTN, gallstone presented to the ED on 8/24 c/o worsening epigastric pain radiating to back associated with nausea and vomiting started last night before eating. No fevers, cp, sob. No prior hx of abdominal surgeries. She is s/p laparoscopic cholecystectomy POD #1. Pulmonary consulted for hypoxia. She is followed by     Past Medical History:   Hypertension    OA (osteoarthritis)    Bronchiectasis    HTN (hypertension)      Past Surgical History:   History of knee replacement    S/P cataract surgery      Family History:       Social History: lives at home    Review of Systems:  All 10 systems reviewed in detailed and found to be negative with the exception of what has already been described above    Allergies:  amoxicillin (Rash)  bacitracin (Unknown)  gentamicin (Rash)    Meds  MEDICATIONS  (STANDING):  acetaminophen   IVPB .. 1000 milliGRAM(s) IV Intermittent once  ALBUTerol    90 MICROgram(s) HFA Inhaler 2 Puff(s) Inhalation every 6 hours  albuterol/ipratropium for Nebulization 3 milliLiter(s) Nebulizer once  azithromycin   Tablet 250 milliGRAM(s) Oral <User Schedule>  brimonidine 0.1% Ophthalmic Solution 1 Drop(s) Left EYE two times a day  ciprofloxacin   IVPB 400 milliGRAM(s) IV Intermittent every 12 hours  desvenlafaxine ER 25 milliGRAM(s) Oral daily  enoxaparin Injectable 40 milliGRAM(s) SubCutaneous every 24 hours  furosemide   Injectable 20 milliGRAM(s) IV Push daily  metroNIDAZOLE  IVPB 500 milliGRAM(s) IV Intermittent every 8 hours  nebivolol 10 milliGRAM(s) Oral daily    MEDICATIONS  (PRN):  acetaminophen     Tablet .. 650 milliGRAM(s) Oral every 6 hours PRN Temp greater or equal to 38C (100.4F), Mild Pain (1 - 3)  morphine  - Injectable 2 milliGRAM(s) IV Push every 4 hours PRN Severe Pain (7 - 10)  ondansetron Injectable 4 milliGRAM(s) IV Push every 6 hours PRN Nausea  senna 2 Tablet(s) Oral at bedtime PRN Constipation    Physical Exam  T(C): 36.8 (08-28-22 @ 09:06), Max: 36.8 (08-28-22 @ 09:06)  HR: 71 (08-28-22 @ 09:06) (68 - 81)  BP: 99/49 (08-28-22 @ 09:06) (97/51 - 110/59)  RR: 18 (08-28-22 @ 09:06) (16 - 18)  SpO2: 95% (08-28-22 @ 09:06) (93% - 96%)  Gen: Alert, oriented, no distress  HEENT: Anicteric sclera, moist mucous membranes, no JVD, no lymphadenopathy, good dentition  Cardio: Regular rhythm and rate, normal S1S2, no murmurs  Resp: Clear to auscultation bilaterally, no wheezing or rhonchi  GI: Nontender, nondistended, normoactive bowel sounds  Ext: No cyanosis, clubbing or edema  Neuro: Nonfocal  Home Oxygen Evaluation:  Pulse ox (SpO2) on room air at rest:	87 %      Labs:                        11.8   15.47 )-----------( 158      ( 27 Aug 2022 06:49 )             36.5     08-27    141  |  111<H>  |  10  ----------------------------<  159<H>  5.2   |  24  |  0.88    Ca    7.6<L>      27 Aug 2022 06:49  Phos  4.3     08-27  Mg     2.0     08-27    TPro  5.8<L>  /  Alb  2.4<L>  /  TBili  0.4  /  DBili  0.2  /  AST  87<H>  /  ALT  61  /  AlkPhos  76  08-27    < from: Xray Chest 1 View- PORTABLE-Urgent (Xray Chest 1 View- PORTABLE-Urgent .) (08.26.22 @ 19:36) >  ACC: [id number] EXAM:  XR CHEST PORTABLE URGENT 1V                        ACC: [id number] EXAM:  XR CHEST PORTABLE IMMED 1V                          PROCEDURE DATE:  08/25/2022          INTERPRETATION:  AP erect chest on August 25, 2022 at 1:57 PM. Patient is   short of breath.    Elevated right hemidiaphragm again noted. Heart magnified by technique   and possibly enlarged.    Slightly prominent markings centrally at this time suggests slight CHF   which is increase of September 14, 2016.    Follow-up AP chest persistent mild CHF.    IMPRESSION: Rather mild CHF.      < from: US Abdomen Upper Quadrant Right (08.24.22 @ 18:54) >  ACC: [id number] EXAM:  US ABDOMEN RT UPR QUADRANT                          PROCEDURE DATE:  08/24/2022          INTERPRETATION:  CLINICAL INFORMATION: Right upper quadrant pain    COMPARISON: None available.    TECHNIQUE: Sonography of the right upper quadrant.    FINDINGS:  Liver: Hepatic steatosis.  Bile ducts: Normal caliber. Common bile duct measures 7 mm.  Gallbladder: Gallstones. Gallbladder wall thickening gallbladder wall   edema measuring up to 0.5 cm. Sonographic Urena sign.  Pancreas:Visualized portions are within normal limits.  Right kidney: 9.9 cm. No hydronephrosis.  Ascites: None.  IVC: Visualized portions are within normal limits.    IMPRESSION:    Gallstones with gallbladder wall thickening and sonographic Urena sign   suggesting acute cholecystitis.    < end of copied text >   HPI: JOHNNY VERDUGO is a 77y old Female with PMH bronchiectasis, HTN, gallstone presented to the ED on 8/24 c/o worsening epigastric pain radiating to back associated with nausea and vomiting started last night before eating. No fevers, cp, sob. No prior hx of abdominal surgeries. She is s/p laparoscopic cholecystectomy POD #1. Pulmonary consulted for hypoxia. She is followed by dr ragland. we discussed her breathing and bronchiectasis. she did not use incentive spirometry. she is not using her inhaler much    Past Medical History:   Hypertension    OA (osteoarthritis)    Bronchiectasis    HTN (hypertension)      Past Surgical History:   History of knee replacement    S/P cataract surgery      Family History:       Social History: lives at home    Review of Systems:  All 10 systems reviewed in detailed and found to be negative with the exception of what has already been described above    Allergies:  amoxicillin (Rash)  bacitracin (Unknown)  gentamicin (Rash)    Meds  MEDICATIONS  (STANDING):  acetaminophen   IVPB .. 1000 milliGRAM(s) IV Intermittent once  ALBUTerol    90 MICROgram(s) HFA Inhaler 2 Puff(s) Inhalation every 6 hours  albuterol/ipratropium for Nebulization 3 milliLiter(s) Nebulizer once  azithromycin   Tablet 250 milliGRAM(s) Oral <User Schedule>  brimonidine 0.1% Ophthalmic Solution 1 Drop(s) Left EYE two times a day  ciprofloxacin   IVPB 400 milliGRAM(s) IV Intermittent every 12 hours  desvenlafaxine ER 25 milliGRAM(s) Oral daily  enoxaparin Injectable 40 milliGRAM(s) SubCutaneous every 24 hours  furosemide   Injectable 20 milliGRAM(s) IV Push daily  metroNIDAZOLE  IVPB 500 milliGRAM(s) IV Intermittent every 8 hours  nebivolol 10 milliGRAM(s) Oral daily    MEDICATIONS  (PRN):  acetaminophen     Tablet .. 650 milliGRAM(s) Oral every 6 hours PRN Temp greater or equal to 38C (100.4F), Mild Pain (1 - 3)  morphine  - Injectable 2 milliGRAM(s) IV Push every 4 hours PRN Severe Pain (7 - 10)  ondansetron Injectable 4 milliGRAM(s) IV Push every 6 hours PRN Nausea  senna 2 Tablet(s) Oral at bedtime PRN Constipation    Physical Exam  T(C): 36.8 (08-28-22 @ 09:06), Max: 36.8 (08-28-22 @ 09:06)  HR: 71 (08-28-22 @ 09:06) (68 - 81)  BP: 99/49 (08-28-22 @ 09:06) (97/51 - 110/59)  RR: 18 (08-28-22 @ 09:06) (16 - 18)  SpO2: 95% (08-28-22 @ 09:06) (93% - 96%)  Gen: Alert, oriented, no distress  HEENT: Anicteric sclera, moist mucous membranes, no JVD, no lymphadenopathy, good dentition  Cardio: Regular rhythm and rate, normal S1S2, no murmurs  Resp: Clear to auscultation bilaterally, no wheezing or rhonchi  GI: Nontender, nondistended, normoactive bowel sounds  Ext: No cyanosis, clubbing or edema  Neuro: Nonfocal  Home Oxygen Evaluation:  Pulse ox (SpO2) on room air at rest:	87 %      Labs:                        11.8   15.47 )-----------( 158      ( 27 Aug 2022 06:49 )             36.5     08-27    141  |  111<H>  |  10  ----------------------------<  159<H>  5.2   |  24  |  0.88    Ca    7.6<L>      27 Aug 2022 06:49  Phos  4.3     08-27  Mg     2.0     08-27    TPro  5.8<L>  /  Alb  2.4<L>  /  TBili  0.4  /  DBili  0.2  /  AST  87<H>  /  ALT  61  /  AlkPhos  76  08-27    < from: Xray Chest 1 View- PORTABLE-Urgent (Xray Chest 1 View- PORTABLE-Urgent .) (08.26.22 @ 19:36) >  ACC: [id number] EXAM:  XR CHEST PORTABLE URGENT 1V                        ACC: [id number] EXAM:  XR CHEST PORTABLE IMMED 1V                          PROCEDURE DATE:  08/25/2022          INTERPRETATION:  AP erect chest on August 25, 2022 at 1:57 PM. Patient is   short of breath.    Elevated right hemidiaphragm again noted. Heart magnified by technique   and possibly enlarged.    Slightly prominent markings centrally at this time suggests slight CHF   which is increase of September 14, 2016.    Follow-up AP chest persistent mild CHF.    IMPRESSION: Rather mild CHF.      < from: US Abdomen Upper Quadrant Right (08.24.22 @ 18:54) >  ACC: [id number] EXAM:  US ABDOMEN RT UPR QUADRANT                          PROCEDURE DATE:  08/24/2022          INTERPRETATION:  CLINICAL INFORMATION: Right upper quadrant pain    COMPARISON: None available.    TECHNIQUE: Sonography of the right upper quadrant.    FINDINGS:  Liver: Hepatic steatosis.  Bile ducts: Normal caliber. Common bile duct measures 7 mm.  Gallbladder: Gallstones. Gallbladder wall thickening gallbladder wall   edema measuring up to 0.5 cm. Sonographic Urena sign.  Pancreas:Visualized portions are within normal limits.  Right kidney: 9.9 cm. No hydronephrosis.  Ascites: None.  IVC: Visualized portions are within normal limits.    IMPRESSION:    Gallstones with gallbladder wall thickening and sonographic Urena sign   suggesting acute cholecystitis.    < end of copied text >

## 2022-08-28 NOTE — CONSULT NOTE ADULT - ASSESSMENT
77y old Female with PMH bronchiectasis, HTN, gallstone s/p laparoscopic cholecystectomy with hypoxia  1. hypoxia: baseline not on oxygen  -continue duonebs  -incentive spirometry  -continue lasix  2. bronchiectasis: continue azithromycin  3. s/p cholecystectomy: continue cipro/flagyl 77y old Female with PMH bronchiectasis, HTN, gallstone s/p laparoscopic cholecystectomy with hypoxia  1. hypoxia: baseline not on oxygen  -continue duonebs  -incentive spirometry, encouraged more frequent use  -add chest pt  -chest ct with contrast  -continue lasix  2. bronchiectasis: continue azithromycin  3. s/p cholecystectomy: continue cipro/flagyl

## 2022-08-28 NOTE — PROGRESS NOTE ADULT - ASSESSMENT
76 yo female with RUQ pain, WBC elevated, US suggestive of acute cholecystitis.  PT is s/p laparoscopic cholecystectomy POD 1. WBC is elevated. PT presented yesterday with wheezing, medicine team assessed the pt and started on lasix due to possible fluid overload    Plan:    Low fat diet  Pain control  Nausea control  IV antibiotics  IVF hydration  CXR am  Possible DC today    Case discussed with Dr. Aldana

## 2022-08-29 ENCOUNTER — TRANSCRIPTION ENCOUNTER (OUTPATIENT)
Age: 77
End: 2022-08-29

## 2022-08-29 VITALS
OXYGEN SATURATION: 94 % | RESPIRATION RATE: 17 BRPM | HEART RATE: 68 BPM | TEMPERATURE: 99 F | DIASTOLIC BLOOD PRESSURE: 65 MMHG | SYSTOLIC BLOOD PRESSURE: 110 MMHG

## 2022-08-29 LAB
ANION GAP SERPL CALC-SCNC: 5 MMOL/L — SIGNIFICANT CHANGE UP (ref 5–17)
BASOPHILS # BLD AUTO: 0.02 K/UL — SIGNIFICANT CHANGE UP (ref 0–0.2)
BASOPHILS NFR BLD AUTO: 0.1 % — SIGNIFICANT CHANGE UP (ref 0–2)
BUN SERPL-MCNC: 22 MG/DL — SIGNIFICANT CHANGE UP (ref 7–23)
CALCIUM SERPL-MCNC: 8.5 MG/DL — SIGNIFICANT CHANGE UP (ref 8.5–10.1)
CHLORIDE SERPL-SCNC: 113 MMOL/L — HIGH (ref 96–108)
CO2 SERPL-SCNC: 26 MMOL/L — SIGNIFICANT CHANGE UP (ref 22–31)
CREAT SERPL-MCNC: 0.78 MG/DL — SIGNIFICANT CHANGE UP (ref 0.5–1.3)
EGFR: 78 ML/MIN/1.73M2 — SIGNIFICANT CHANGE UP
EOSINOPHIL # BLD AUTO: 0.16 K/UL — SIGNIFICANT CHANGE UP (ref 0–0.5)
EOSINOPHIL NFR BLD AUTO: 1 % — SIGNIFICANT CHANGE UP (ref 0–6)
GLUCOSE SERPL-MCNC: 99 MG/DL — SIGNIFICANT CHANGE UP (ref 70–99)
HCT VFR BLD CALC: 35.2 % — SIGNIFICANT CHANGE UP (ref 34.5–45)
HGB BLD-MCNC: 11.3 G/DL — LOW (ref 11.5–15.5)
IMM GRANULOCYTES NFR BLD AUTO: 0.4 % — SIGNIFICANT CHANGE UP (ref 0–1.5)
LYMPHOCYTES # BLD AUTO: 29.2 % — SIGNIFICANT CHANGE UP (ref 13–44)
LYMPHOCYTES # BLD AUTO: 4.68 K/UL — HIGH (ref 1–3.3)
MAGNESIUM SERPL-MCNC: 2.1 MG/DL — SIGNIFICANT CHANGE UP (ref 1.6–2.6)
MCHC RBC-ENTMCNC: 31.7 PG — SIGNIFICANT CHANGE UP (ref 27–34)
MCHC RBC-ENTMCNC: 32.1 GM/DL — SIGNIFICANT CHANGE UP (ref 32–36)
MCV RBC AUTO: 98.9 FL — SIGNIFICANT CHANGE UP (ref 80–100)
MONOCYTES # BLD AUTO: 1.19 K/UL — HIGH (ref 0–0.9)
MONOCYTES NFR BLD AUTO: 7.4 % — SIGNIFICANT CHANGE UP (ref 2–14)
NEUTROPHILS # BLD AUTO: 9.92 K/UL — HIGH (ref 1.8–7.4)
NEUTROPHILS NFR BLD AUTO: 61.9 % — SIGNIFICANT CHANGE UP (ref 43–77)
PHOSPHATE SERPL-MCNC: 1.8 MG/DL — LOW (ref 2.5–4.5)
PLATELET # BLD AUTO: 218 K/UL — SIGNIFICANT CHANGE UP (ref 150–400)
POTASSIUM SERPL-MCNC: 3.8 MMOL/L — SIGNIFICANT CHANGE UP (ref 3.5–5.3)
POTASSIUM SERPL-SCNC: 3.8 MMOL/L — SIGNIFICANT CHANGE UP (ref 3.5–5.3)
RBC # BLD: 3.56 M/UL — LOW (ref 3.8–5.2)
RBC # FLD: 13.5 % — SIGNIFICANT CHANGE UP (ref 10.3–14.5)
SODIUM SERPL-SCNC: 144 MMOL/L — SIGNIFICANT CHANGE UP (ref 135–145)
WBC # BLD: 16.04 K/UL — HIGH (ref 3.8–10.5)
WBC # FLD AUTO: 16.04 K/UL — HIGH (ref 3.8–10.5)

## 2022-08-29 PROCEDURE — 71045 X-RAY EXAM CHEST 1 VIEW: CPT | Mod: 26

## 2022-08-29 PROCEDURE — 99239 HOSP IP/OBS DSCHRG MGMT >30: CPT

## 2022-08-29 RX ORDER — FUROSEMIDE 40 MG
40 TABLET ORAL ONCE
Refills: 0 | Status: COMPLETED | OUTPATIENT
Start: 2022-08-29 | End: 2022-08-29

## 2022-08-29 RX ORDER — CEFUROXIME AXETIL 250 MG
500 TABLET ORAL EVERY 12 HOURS
Refills: 0 | Status: DISCONTINUED | OUTPATIENT
Start: 2022-08-29 | End: 2022-08-29

## 2022-08-29 RX ORDER — SODIUM,POTASSIUM PHOSPHATES 278-250MG
1 POWDER IN PACKET (EA) ORAL THREE TIMES A DAY
Refills: 0 | Status: DISCONTINUED | OUTPATIENT
Start: 2022-08-29 | End: 2022-08-29

## 2022-08-29 RX ORDER — CEFUROXIME AXETIL 250 MG
1 TABLET ORAL
Qty: 10 | Refills: 0
Start: 2022-08-29 | End: 2022-09-02

## 2022-08-29 RX ORDER — SODIUM,POTASSIUM PHOSPHATES 278-250MG
1 POWDER IN PACKET (EA) ORAL
Qty: 3 | Refills: 0
Start: 2022-08-29 | End: 2022-08-29

## 2022-08-29 RX ORDER — METRONIDAZOLE 500 MG
500 TABLET ORAL EVERY 8 HOURS
Refills: 0 | Status: DISCONTINUED | OUTPATIENT
Start: 2022-08-29 | End: 2022-08-29

## 2022-08-29 RX ORDER — AZITHROMYCIN 500 MG/1
1 TABLET, FILM COATED ORAL
Qty: 0 | Refills: 0 | DISCHARGE
Start: 2022-08-29

## 2022-08-29 RX ORDER — AMLODIPINE BESYLATE 2.5 MG/1
1 TABLET ORAL
Qty: 0 | Refills: 0 | DISCHARGE

## 2022-08-29 RX ORDER — ROSUVASTATIN CALCIUM 5 MG/1
1 TABLET ORAL
Qty: 0 | Refills: 0 | DISCHARGE

## 2022-08-29 RX ORDER — ALBUTEROL 90 UG/1
2 AEROSOL, METERED ORAL
Qty: 1 | Refills: 0
Start: 2022-08-29 | End: 2022-09-27

## 2022-08-29 RX ORDER — CIPROFLOXACIN LACTATE 400MG/40ML
500 VIAL (ML) INTRAVENOUS EVERY 12 HOURS
Refills: 0 | Status: DISCONTINUED | OUTPATIENT
Start: 2022-08-29 | End: 2022-08-29

## 2022-08-29 RX ADMIN — BRIMONIDINE TARTRATE 1 DROP(S): 2 SOLUTION/ DROPS OPHTHALMIC at 09:29

## 2022-08-29 RX ADMIN — NEBIVOLOL HYDROCHLORIDE 10 MILLIGRAM(S): 5 TABLET ORAL at 09:02

## 2022-08-29 RX ADMIN — Medication 500 MILLIGRAM(S): at 10:42

## 2022-08-29 RX ADMIN — DESVENLAFAXINE 25 MILLIGRAM(S): 50 TABLET, EXTENDED RELEASE ORAL at 09:01

## 2022-08-29 RX ADMIN — Medication 20 MILLIGRAM(S): at 09:02

## 2022-08-29 RX ADMIN — Medication 1 TABLET(S): at 13:53

## 2022-08-29 RX ADMIN — Medication 100 MILLIGRAM(S): at 05:14

## 2022-08-29 RX ADMIN — ALBUTEROL 2 PUFF(S): 90 AEROSOL, METERED ORAL at 02:36

## 2022-08-29 RX ADMIN — Medication 650 MILLIGRAM(S): at 04:26

## 2022-08-29 RX ADMIN — ALBUTEROL 2 PUFF(S): 90 AEROSOL, METERED ORAL at 08:38

## 2022-08-29 RX ADMIN — AZITHROMYCIN 250 MILLIGRAM(S): 500 TABLET, FILM COATED ORAL at 09:01

## 2022-08-29 RX ADMIN — Medication 500 MILLIGRAM(S): at 12:38

## 2022-08-29 RX ADMIN — Medication 40 MILLIGRAM(S): at 10:42

## 2022-08-29 RX ADMIN — Medication 200 MILLIGRAM(S): at 09:01

## 2022-08-29 NOTE — PHYSICAL THERAPY INITIAL EVALUATION ADULT - DIAGNOSIS, PT EVAL
RUQ abdominal pain , nausea, vomiting due to acute cholecystitis, cholelithiasis, s/p cholecystectomy

## 2022-08-29 NOTE — PROGRESS NOTE ADULT - ASSESSMENT
76 y/o female with PMHx of bronchiectasis, HTN, HLD , OP, GERD, Anxiety, Cholelithiasis admitted to surgery service with  c/o worsening epigastric pain radiating to back associated with nausea and vomiting started  night before eating. No fevers, cp, sob. No prior hx of abdominal surgeries.      Assessment/Plan:    RUQ abdominal pain , nausea, vomiting due to acute cholecystitis, cholelithiasis  s/p cholecystectomy   - as per surgery   - c/w IV fluids  - IV cipro and flagyl- no need d/w surgery team , will stop   - IS, ambulation     Bronchiectasis with bronchospasm after surgery  Mild fluid overload s/p IV fluids   Acute hypoxic respiratory failure multifactorial, bronchospasm, bilateral pleural effusion, atelectasis, possible LLL PNA vs atelectasis  - stop IV fluids, c/w IV lasix 20 ( stop amlodipine and losartan to allow higher BP )   -8/27 s/p  IV steroids 1 dose  - CXR - noted  8/26, repeat CXR - bilateral effusions  - - albuterol, chest pt   - c/w prophylactic dose of azithromycin  add ceftin 500 q12h for 5 more days first dose her to see if not allergic - reports rash on amoxacillin   - pulmonary consult   - O2 at test 86%      Hypokalemia, Hypophosphatemia, hypomagnesemia, resolved   - replace, monitor    HTN overcontrolled - c/w BB,   and ARB , stop amlodipine monitor BP as o/p     HLD - hold statins due to transaminitis    GERD - c/w PPI    Glaucoma of left eye - c/w drops    DVT PPX: as per surgery    Advance Directive:  - Full code, diagnosis, prognosis discussed    Disposition: as per surgery    Dispo - monitor inpatient , IV lasix, stop amlodipine BP meds, can be discharged on po abx, hold amlodipine      Thank you for consult, will follow with you. stable for discharge

## 2022-08-29 NOTE — PHYSICAL THERAPY INITIAL EVALUATION ADULT - PERTINENT HX OF CURRENT PROBLEM, REHAB EVAL
76 y/o female with PMHx of bronchiectasis, HTN, HLD , OP, GERD, Anxiety, Cholelithiasis admitted to surgery service with  c/o worsening epigastric pain radiating to back associated with nausea and vomiting started  night before eating. No fevers, cp, sob. No prior hx of abdominal surgeries.

## 2022-08-29 NOTE — PROGRESS NOTE ADULT - ASSESSMENT
77y old Female with PMH bronchiectasis, HTN, gallstone s/p laparoscopic cholecystectomy with hypoxia  1. hypoxia: baseline not on oxygen  -continue duonebs  -incentive spirometry, encouraged more frequent use  -continue chest pt  -chest ct with contrast reveals small pleural effusion on right, and nodular opacities suggestive of pneumonia  -continue lasix  2. bronchiectasis: continue azithromycin  3. s/p cholecystectomy: continue cipro/flagyl for now  -given nodular opacities suggestive of pneumonia on ct, suggest broadening coverage to better cover lungs as well, ie augmentin? discuss with surgery

## 2022-08-29 NOTE — DISCHARGE NOTE NURSING/CASE MANAGEMENT/SOCIAL WORK - PATIENT PORTAL LINK FT
You can access the FollowMyHealth Patient Portal offered by [facility] by registering at the following website: [email or web address]. By joining [email or web address] FollowMyHealth portal, you will also be able to view your health information using other applications (apps) compatible with our system.

## 2022-08-29 NOTE — PROGRESS NOTE ADULT - ATTENDING COMMENTS
Patient is resting comfortably in chair without SOB, pain  is controlled, and tolerating diet. Patient cleared for medica & pulmonary standpoint for discharge on PO Abx for 5 days.      Plan  - D/C home today

## 2022-08-29 NOTE — PROGRESS NOTE ADULT - ASSESSMENT
78 yo female with RUQ pain, WBC elevated, US suggestive of acute cholecystitis.  PT is s/p laparoscopic cholecystectomy POD 1. WBC is elevated. PT presented yesterday with wheezing, medicine team assessed the pt and started on lasix due to possible fluid overload.  CT chest also performed suggestive of possible PNA.     Plan:  Low fat diet  Pain control  Nausea control  D/C cipro/flagyl  Begin ceftin PO, first dose in house  Hospitalist recommendations appreciated   Pulmonology recommendations appreciated   Possible DC today    Case discussed with Dr. Aldana

## 2022-08-29 NOTE — PROGRESS NOTE ADULT - SUBJECTIVE AND OBJECTIVE BOX
Chief Complaint: RUQ pain, nausea, vomiting     HPI:    76 y/o female with PMHx of bronchiectasis, HTN, HLD , OP, GERD, Anxiety, Cholelithiasis admitted to surgery service with  c/o worsening epigastric pain radiating to back associated with nausea and vomiting started  night before eating. No fevers, cp, sob. No prior hx of abdominal surgeries.       - pt seen and examined for preop clearance, denies cp, dyspnea, RUQ pain improved, afebrile, plan discussed, medications reviewed    - pt seen and examined in am, RUQ persist, denies nausea, NPO for OR, denies cp, dyspnea, plapitations   - pt wheezing, dyspneic on 2 L sating at 95-90 % , denies cp, + RUQ pain, denies nausea, vomiting   - pt feels better, wheezing stopped, but still using 2 L of O2, RUQ pain persist, denies cp, abdominal pain    Review of system- Rest of the review of system are negative except mentioned in HPI    PAST MEDICAL & SURGICAL HISTORY:  Hypertension  OA (osteoarthritis)  History of knee replacement  S/P cataract surgery    Social history : Denies ETOH use, IVDU, smoking   No pertinent history of stroke, MI,  DM  in first degree relatives.    Vitals reviewed for last 24 hours  T(C): 36.8 (22 @ 09:06), Max: 36.8 (22 @ 09:06)  T(F): 98.2 (22 @ 09:06), Max: 98.2 (22 @ 09:06)  HR: 71 (22 @ 09:06) (68 - 81)  BP: 99/49 (22 @ 09:06) (97/51 - 110/59)  RR: 18 (22 @ 09:06) (16 - 18)  SpO2: 95% (22 @ 09:06) (93% - 96%)  Wt(kg): --      PHYSICAL EXAM:    Constitutional: NAD, awake and alert, well-developed  HEENT: PERR, EOMI, Normal Hearing, MMM  Neck: Soft and supple, No LAD, No JVD  Respiratory: Breath sounds decreased  bilaterally, occasional  wheezing, no rales or rhonchi  Cardiovascular: S1 and S2, regular rate and rhythm, no Murmurs, gallops or rubs  Gastrointestinal: Bowel Sounds present, soft, RUQ tenderness  nondistended, no guarding, no rebound  Extremities: No peripheral edema  Vascular: 2+ peripheral pulses  Neurological: A/O x 3, no focal deficits  Musculoskeletal: 5/5 strength b/l upper and lower extremities  Skin: No rashes      Labs: All Labs Reviewed:      141  |  111<H>  |  10  ----------------------------<  159<H>  5.2   |  24  |  0.88    Ca    7.6<L>      27 Aug 2022 06:49  Phos  4.3       Mg     2.0         TPro  5.8<L>  /  Alb  2.4<L>  /  TBili  0.4  /  DBili  0.2  /  AST  87<H>  /  ALT  61  /  AlkPhos  76                              11.8   15.47 )-----------( 158      ( 27 Aug 2022 06:49 )             36.5             LIVER FUNCTIONS - ( 27 Aug 2022 06:49 )  Alb: 2.4 g/dL / Pro: 5.8 gm/dL / ALK PHOS: 76 U/L / ALT: 61 U/L / AST: 87 U/L / GGT: x             Urinalysis Basic - ( 25 Aug 2022 12:53 )    Color: Yellow / Appearance: Clear / S.015 / pH: x  Gluc: x / Ketone: Negative  / Bili: Negative / Urobili: Negative   Blood: x / Protein: 30 mg/dL / Nitrite: Negative   Leuk Esterase: Negative / RBC: 0-2 /HPF / WBC 0-2   Sq Epi: x / Non Sq Epi: Occasional / Bacteria: Occasional                 Blood Culture:   Culture - Blood (22 @ 20:03)    Specimen Source: .Blood None    Culture Results:   No growth to date.      RADIOLOGY/EKG: all reviewed    12 Lead ECG (22 @ 06:58) >  Ventricular Rate 77 BPM    Atrial Rate 77 BPM    P-R Interval 164 ms    QRS Duration 88 ms    Q-T Interval 428 ms    QTC Calculation(Bazett) 484 ms    P Axis 46 degrees    R Axis 27 degrees    T Axis 45 degrees    Diagnosis Line Normal sinus rhythm  Prolonged QT  When compared with ECG of 14-SEP-2016 15:19,  No significant change was found      CT Angio Chest PE Protocol w/ IV Cont (22 @ 14:13) >  FINDINGS:    PULMONARY VESSELS: No pulmonary embolus. Main pulmonary artery normal in  diameter.    HEART AND VASCULATURE: Heart size is normal. No pericardial effusion. No   aortic aneurysm or dissection.    LUNGS, AIRWAYS, PLEURA: Patent central airways. Small right and trace   left pleural effusions. Partial atelectasis of right lower lobe and   lingula. Nodular opacities within basilar left lower lobe possibly   infectious in etiology.    MEDIASTINUM: No mass or lymphadenopathy.    UPPER ABDOMEN: Cholecystectomy. Fluid within the gallbladder fossa.    BONES AND SOFT TISSUES: No aggressive osseous lesion.    LOWER NECK: Within normal limits.    IMPRESSION:    No pulmonary embolus.    Small right and trace left pleural effusions. Partial atelectasis of   right lower lobe and lingula. Nodular opacities within basilar left lower   lobe possibly infectious in etiology.        < from: NM Hepatobiliary Imaging (22 @ 12:20) >  IMPRESSION:  Abnormal morphine-augmented hepatobiliary scan compatible   with acute cholecystitis.    < end of copied text >  < from: US Abdomen Upper Quadrant Right (22 @ 18:54) >  FINDINGS:  Liver: Hepatic steatosis.  Bile ducts: Normal caliber. Common bile duct measures 7 mm.  Gallbladder: Gallstones. Gallbladder wall thickening gallbladder wall   edema measuring up to 0.5 cm. Sonographic Urena sign.  Pancreas:Visualized portions are within normal limits.  Right kidney: 9.9 cm. No hydronephrosis.  Ascites: None.  IVC: Visualized portions are within normal limits.    IMPRESSION:    Gallstones with gallbladder wall thickening and sonographic Urena sign   suggesting acute cholecystitis.      < end of copied text >    Medications:  MEDICATIONS  (STANDING):  acetaminophen   IVPB .. 1000 milliGRAM(s) IV Intermittent once  amLODIPine   Tablet 5 milliGRAM(s) Oral at bedtime  azithromycin   Tablet 250 milliGRAM(s) Oral <User Schedule>  brimonidine 0.1% Ophthalmic Solution 1 Drop(s) Left EYE two times a day  ciprofloxacin   IVPB 400 milliGRAM(s) IV Intermittent every 12 hours  enoxaparin Injectable 40 milliGRAM(s) SubCutaneous every 24 hours  losartan 50 milliGRAM(s) Oral daily  metroNIDAZOLE  IVPB 500 milliGRAM(s) IV Intermittent every 8 hours  sodium chloride 0.9%. 1000 milliLiter(s) (100 mL/Hr) IV Continuous <Continuous>    
Patient seen and examined at bedside this am.  Yesterday patient required oxygen, was 87% on RA.  CT chest ordered, shows atelectasis, B/L pleural effusions and possible PNA.  Started on azithromycin. Patient states she feels better this am.  Denies fever, chills, chest pain, sob, abdominal pain, n/v/d.     Vital Signs (24 Hrs):  T(C): 36.7 (08-29-22 @ 08:54), Max: 36.7 (08-29-22 @ 08:54)  HR: 73 (08-29-22 @ 08:54) (67 - 79)  BP: 127/60 (08-29-22 @ 08:54) (93/56 - 127/60)  RR: 18 (08-29-22 @ 08:54) (18 - 18)  SpO2: 96% (08-29-22 @ 08:54) (94% - 96%)  Wt(kg): --  Daily     Daily     I&O's Summary    28 Aug 2022 07:01  -  29 Aug 2022 07:00  --------------------------------------------------------  IN: 0 mL / OUT: 1280 mL / NET: -1280 mL      Physical Exam:  General: AAOx3, obese habitus female, NAD  Chest: Normal respiratory effort  Heart: RRR  Abdomen: Soft, mildly distended, incision with no signs of infection, appropriate tenderness  Neuro/Psych: No localized deficits. Normal speech, normal tone  Skin: Normal, no rashes, no lesions noted.   Extremities: Warm, well perfused, no edema, Pulses intact    .  LABS:                         11.3   16.04 )-----------( 218      ( 29 Aug 2022 07:14 )             35.2     08-29    144  |  113<H>  |  22  ----------------------------<  99  3.8   |  26  |  0.78    Ca    8.5      29 Aug 2022 07:14  Phos  1.8     08-29  Mg     2.1     08-29                RADIOLOGY, EKG & ADDITIONAL TESTS: Reviewed. 
Chief Complaint: RUQ pain, nausea, vomiting     HPI:    78 y/o female with PMHx of bronchiectasis, HTN, HLD , OP, GERD, Anxiety, Cholelithiasis admitted to surgery service with  c/o worsening epigastric pain radiating to back associated with nausea and vomiting started  night before eating. No fevers, cp, sob. No prior hx of abdominal surgeries.       - pt seen and examined for preop clearance, denies cp, dyspnea, RUQ pain improved, afebrile, plan discussed, medications reviewed    - pt seen and examined in am, RUQ persist, denies nausea, NPO for OR, denies cp, dyspnea, plapitations   - pt wheezing, dyspneic on 2 L sating at 95-90 % , denies cp, + RUQ pain, denies nausea, vomiting   - pt feels better, wheezing stopped, but still using 2 L of O2, RUQ pain persist, denies cp, abdominal pain   - pt feels better, denies cough, chest pain, dyspnea, tolerating po intake, + flatus, no bm, afebrile, eager to go home    Review of system- Rest of the review of system are negative except mentioned in HPI    PAST MEDICAL & SURGICAL HISTORY:  Hypertension  OA (osteoarthritis)  History of knee replacement  S/P cataract surgery    Social history : Denies ETOH use, IVDU, smoking   No pertinent history of stroke, MI,  DM  in first degree relatives.    Vitals reviewed for last 24 hours  T(C): 36.7 (22 @ 08:54), Max: 36.7 (22 @ 08:54)  T(F): 98.1 (22 @ 08:54), Max: 98.1 (22 @ 08:54)  HR: 73 (22 @ 08:54) (67 - 79)  BP: 127/60 (22 @ 08:54) (93/56 - 127/60)  RR: 18 (22 @ 08:54) (18 - 18)  SpO2: 96% (22 @ 08:54) (94% - 96%)  Wt(kg): --      PHYSICAL EXAM:    Constitutional: NAD, awake and alert, well-developed  HEENT: PERR, EOMI, Normal Hearing, MMM  Neck: Soft and supple, No LAD, No JVD  Respiratory: Breath sounds decreased  bilaterally, occasional  wheezing, no rales or rhonchi  Cardiovascular: S1 and S2, regular rate and rhythm, no Murmurs, gallops or rubs  Gastrointestinal: Bowel Sounds present, soft, RUQ tenderness  nondistended, no guarding, no rebound  Extremities: No peripheral edema  Vascular: 2+ peripheral pulses  Neurological: A/O x 3, no focal deficits  Musculoskeletal: 5/5 strength b/l upper and lower extremities  Skin: No rashes      Labs: All Labs Reviewed:      144  |  113<H>  |  22  ----------------------------<  99  3.8   |  26  |  0.78    Ca    8.5      29 Aug 2022 07:14  Phos  1.8       Mg     2.1                                   11.3   16.04 )-----------( 218      ( 29 Aug 2022 07:14 )             35.2                       08-    141  |  111<H>  |  10  ----------------------------<  159<H>  5.2   |  24  |  0.88    Ca    7.6<L>      27 Aug 2022 06:49  Phos  4.3       Mg     2.0         TPro  5.8<L>  /  Alb  2.4<L>  /  TBili  0.4  /  DBili  0.2  /  AST  87<H>  /  ALT  61  /  AlkPhos  76                              11.8   15.47 )-----------( 158      ( 27 Aug 2022 06:49 )             36.5             LIVER FUNCTIONS - ( 27 Aug 2022 06:49 )  Alb: 2.4 g/dL / Pro: 5.8 gm/dL / ALK PHOS: 76 U/L / ALT: 61 U/L / AST: 87 U/L / GGT: x             Urinalysis Basic - ( 25 Aug 2022 12:53 )    Color: Yellow / Appearance: Clear / S.015 / pH: x  Gluc: x / Ketone: Negative  / Bili: Negative / Urobili: Negative   Blood: x / Protein: 30 mg/dL / Nitrite: Negative   Leuk Esterase: Negative / RBC: 0-2 /HPF / WBC 0-2   Sq Epi: x / Non Sq Epi: Occasional / Bacteria: Occasional                 Blood Culture:   Culture - Blood (22 @ 20:03)    Specimen Source: .Blood None    Culture Results:   No growth to date.      RADIOLOGY/EKG: all reviewed    12 Lead ECG (22 @ 06:58) >  Ventricular Rate 77 BPM    Atrial Rate 77 BPM    P-R Interval 164 ms    QRS Duration 88 ms    Q-T Interval 428 ms    QTC Calculation(Bazett) 484 ms    P Axis 46 degrees    R Axis 27 degrees    T Axis 45 degrees    Diagnosis Line Normal sinus rhythm  Prolonged QT  When compared with ECG of 14-SEP-2016 15:19,  No significant change was found      CT Angio Chest PE Protocol w/ IV Cont (22 @ 14:13) >  FINDINGS:    PULMONARY VESSELS: No pulmonary embolus. Main pulmonary artery normal in  diameter.    HEART AND VASCULATURE: Heart size is normal. No pericardial effusion. No   aortic aneurysm or dissection.    LUNGS, AIRWAYS, PLEURA: Patent central airways. Small right and trace   left pleural effusions. Partial atelectasis of right lower lobe and   lingula. Nodular opacities within basilar left lower lobe possibly   infectious in etiology.    MEDIASTINUM: No mass or lymphadenopathy.    UPPER ABDOMEN: Cholecystectomy. Fluid within the gallbladder fossa.    BONES AND SOFT TISSUES: No aggressive osseous lesion.    LOWER NECK: Within normal limits.    IMPRESSION:    No pulmonary embolus.    Small right and trace left pleural effusions. Partial atelectasis of   right lower lobe and lingula. Nodular opacities within basilar left lower   lobe possibly infectious in etiology.        < from: NM Hepatobiliary Imaging (22 @ 12:20) >  IMPRESSION:  Abnormal morphine-augmented hepatobiliary scan compatible   with acute cholecystitis.    < end of copied text >  < from: US Abdomen Upper Quadrant Right (22 @ 18:54) >  FINDINGS:  Liver: Hepatic steatosis.  Bile ducts: Normal caliber. Common bile duct measures 7 mm.  Gallbladder: Gallstones. Gallbladder wall thickening gallbladder wall   edema measuring up to 0.5 cm. Sonographic Urena sign.  Pancreas:Visualized portions are within normal limits.  Right kidney: 9.9 cm. No hydronephrosis.  Ascites: None.  IVC: Visualized portions are within normal limits.    IMPRESSION:    Gallstones with gallbladder wall thickening and sonographic Urena sign   suggesting acute cholecystitis.      < end of copied text >    Medications:  MEDICATIONS  (STANDING):  acetaminophen   IVPB .. 1000 milliGRAM(s) IV Intermittent once  amLODIPine   Tablet 5 milliGRAM(s) Oral at bedtime  azithromycin   Tablet 250 milliGRAM(s) Oral <User Schedule>  brimonidine 0.1% Ophthalmic Solution 1 Drop(s) Left EYE two times a day  ciprofloxacin   IVPB 400 milliGRAM(s) IV Intermittent every 12 hours  enoxaparin Injectable 40 milliGRAM(s) SubCutaneous every 24 hours  losartan 50 milliGRAM(s) Oral daily  metroNIDAZOLE  IVPB 500 milliGRAM(s) IV Intermittent every 8 hours  sodium chloride 0.9%. 1000 milliLiter(s) (100 mL/Hr) IV Continuous <Continuous>    
Subjective:  96% on 2 L NC  using incentive spirometry    Review of Systems:  All 10 systems reviewed in detailed and found to be negative with the exception of what has already been described above    Allergies:  amoxicillin (Rash)  bacitracin (Unknown)  gentamicin (Rash)    Meds  MEDICATIONS  (STANDING):  acetaminophen   IVPB .. 1000 milliGRAM(s) IV Intermittent once  ALBUTerol    90 MICROgram(s) HFA Inhaler 2 Puff(s) Inhalation every 6 hours  albuterol/ipratropium for Nebulization 3 milliLiter(s) Nebulizer once  azithromycin   Tablet 250 milliGRAM(s) Oral <User Schedule>  brimonidine 0.1% Ophthalmic Solution 1 Drop(s) Left EYE two times a day  desvenlafaxine ER 25 milliGRAM(s) Oral daily  enoxaparin Injectable 40 milliGRAM(s) SubCutaneous every 24 hours  furosemide   Injectable 40 milliGRAM(s) IV Push once  nebivolol 10 milliGRAM(s) Oral daily    MEDICATIONS  (PRN):  acetaminophen     Tablet .. 650 milliGRAM(s) Oral every 6 hours PRN Temp greater or equal to 38C (100.4F), Mild Pain (1 - 3)  morphine  - Injectable 2 milliGRAM(s) IV Push every 4 hours PRN Severe Pain (7 - 10)  ondansetron Injectable 4 milliGRAM(s) IV Push every 6 hours PRN Nausea  senna 2 Tablet(s) Oral at bedtime PRN Constipation    Physical Exam  T(C): 36.7 (08-29-22 @ 08:54), Max: 36.7 (08-29-22 @ 08:54)  HR: 73 (08-29-22 @ 08:54) (67 - 79)  BP: 127/60 (08-29-22 @ 08:54) (93/56 - 127/60)  RR: 18 (08-29-22 @ 08:54) (18 - 18)  SpO2: 96% (08-29-22 @ 08:54) (94% - 96%)  Gen: Alert, oriented, no distress  HEENT: Anicteric sclera, moist mucous membranes, no JVD, no lymphadenopathy, good dentition  Cardio: Regular rhythm and rate, normal S1S2, no murmurs  Resp: Clear to auscultation bilaterally, no wheezing or rhonchi  GI: Nontender, nondistended, normoactive bowel sounds  Ext: No cyanosis, clubbing or edema  Neuro: Nonfocal  Home Oxygen Evaluation:  Pulse ox (SpO2) on room air at rest:	87 %    Labs:                        11.3   16.04 )-----------( 218      ( 29 Aug 2022 07:14 )             35.2     08-29    144  |  113<H>  |  22  ----------------------------<  99  3.8   |  26  |  0.78    Ca    8.5      29 Aug 2022 07:14  Phos  1.8     08-29  Mg     2.1     08-29    < from: Xray Chest 1 View- PORTABLE-Urgent (Xray Chest 1 View- PORTABLE-Urgent .) (08.26.22 @ 19:36) >  ACC: [id number] EXAM:  XR CHEST PORTABLE URGENT 1V                        ACC: [id number] EXAM:  XR CHEST PORTABLE IMMED 1V                          PROCEDURE DATE:  08/25/2022          INTERPRETATION:  AP erect chest on August 25, 2022 at 1:57 PM. Patient is   short of breath.    Elevated right hemidiaphragm again noted. Heart magnified by technique   and possibly enlarged.    Slightly prominent markings centrally at this time suggests slight CHF   which is increase of September 14, 2016.    Follow-up AP chest persistent mild CHF.    IMPRESSION: Rather mild CHF.      < from: US Abdomen Upper Quadrant Right (08.24.22 @ 18:54) >  ACC: [id number] EXAM:  US ABDOMEN RT UPR QUADRANT                          PROCEDURE DATE:  08/24/2022          INTERPRETATION:  CLINICAL INFORMATION: Right upper quadrant pain    COMPARISON: None available.    TECHNIQUE: Sonography of the right upper quadrant.    FINDINGS:  Liver: Hepatic steatosis.  Bile ducts: Normal caliber. Common bile duct measures 7 mm.  Gallbladder: Gallstones. Gallbladder wall thickening gallbladder wall   edema measuring up to 0.5 cm. Sonographic Urena sign.  Pancreas:Visualized portions are within normal limits.  Right kidney: 9.9 cm. No hydronephrosis.  Ascites: None.  IVC: Visualized portions are within normal limits.    IMPRESSION:    Gallstones with gallbladder wall thickening and sonographic Urena sign   suggesting acute cholecystitis.    < from: CT Angio Chest PE Protocol w/ IV Cont (08.28.22 @ 14:13) >  ACC: [id number] EXAM:  CT ANGIO CHEST PULM ART WAWIC                          PROCEDURE DATE:  08/28/2022          INTERPRETATION:  ACC: [id number]  INDICATION, CLINICAL INFORMATION: hypoxia s/p surgery to r/o PE  TECHNIQUE: CT pulmonary angiogram of the chest . Uneventful   administration of 90 cc Omnipaque 350. Coronal, sagittal and 3-D/MIP   images were reconstructed/reviewed.  COMPARISON: No prior chest CT.    FINDINGS:    PULMONARY VESSELS: No pulmonary embolus. Main pulmonary artery normal in  diameter.    HEART AND VASCULATURE: Heart size is normal. No pericardial effusion. No   aortic aneurysm or dissection.    LUNGS, AIRWAYS, PLEURA: Patent central airways. Small right and trace   left pleural effusions. Partial atelectasis of right lower lobe and   lingula. Nodular opacities within basilar left lower lobe possibly   infectious in etiology.    MEDIASTINUM: No mass or lymphadenopathy.    UPPER ABDOMEN: Cholecystectomy. Fluid within the gallbladder fossa.    BONES AND SOFT TISSUES: No aggressive osseous lesion.    LOWER NECK: Within normal limits.    IMPRESSION:    No pulmonary embolus.    Small right and trace left pleural effusions. Partial atelectasis of   right lower lobe and lingula. Nodular opacities within basilar left lower   lobe possibly infectious in etiology.    < end of copied text >  
Chief Complaint: RUQ pain, nausea, vomiting     HPI:    76 y/o female with PMHx of bronchiectasis, HTN, HLD , OP, GERD, Anxiety, Cholelithiasis admitted to surgery service with  c/o worsening epigastric pain radiating to back associated with nausea and vomiting started  night before eating. No fevers, cp, sob. No prior hx of abdominal surgeries.       - pt seen and examined for preop clearance, denies cp, dyspnea, RUQ pain improved, afebrile, plan discussed, medications reviewed    - pt seen and examined in am, RUQ persist, denies nausea, NPO for OR, denies cp, dyspnea, plapitations    Review of system- Rest of the review of system are negative except mentioned in HPI    PAST MEDICAL & SURGICAL HISTORY:  Hypertension  OA (osteoarthritis)  History of knee replacement  S/P cataract surgery    Social history : Denies ETOH use, IVDU, smoking   No pertinent history of stroke, MI,  DM  in first degree relatives.    Vitals reviewed for last 24 hours  T(C): 36.2 (22 @ 16:25), Max: 37 (22 @ 21:05)  T(F): 97.1 (22 @ 16:25), Max: 98.6 (22 @ 21:05)  HR: 68 (22 @ 18:15) (63 - 91)  BP: 103/58 (22 @ 18:00) (94/64 - 151/76)  RR: 16 (22 @ 18:15) (12 - 18)  SpO2: 95% (22 @ 18:15) (91% - 100%)  Wt(kg): --      PHYSICAL EXAM:    Constitutional: NAD, awake and alert, well-developed  HEENT: PERR, EOMI, Normal Hearing, MMM  Neck: Soft and supple, No LAD, No JVD  Respiratory: Breath sounds are clear bilaterally, No wheezing, rales or rhonchi  Cardiovascular: S1 and S2, regular rate and rhythm, no Murmurs, gallops or rubs  Gastrointestinal: Bowel Sounds present, soft, RUQ tenderness  nondistended, no guarding, no rebound  Extremities: No peripheral edema  Vascular: 2+ peripheral pulses  Neurological: A/O x 3, no focal deficits  Musculoskeletal: 5/5 strength b/l upper and lower extremities  Skin: No rashes      Labs: All Labs Reviewed:      140  |  109<H>  |  8   ----------------------------<  132<H>  3.4<L>   |  24  |  0.63    Ca    8.3<L>      26 Aug 2022 06:43  Phos  1.6       Mg     1.8         TPro  6.2  /  Alb  2.9<L>  /  TBili  0.9  /  DBili  x   /  AST  78<H>  /  ALT  57  /  AlkPhos  82                              12.7   15.15 )-----------( 171      ( 26 Aug 2022 06:43 )             38.6             LIVER FUNCTIONS - ( 26 Aug 2022 06:43 )  Alb: 2.9 g/dL / Pro: 6.2 gm/dL / ALK PHOS: 82 U/L / ALT: 57 U/L / AST: 78 U/L / GGT: x             PT/INR - ( 26 Aug 2022 06:43 )   PT: 15.2 sec;   INR: 1.31 ratio         PTT - ( 26 Aug 2022 06:43 )  PTT:29.6 sec    Urinalysis Basic - ( 25 Aug 2022 12:53 )    Color: Yellow / Appearance: Clear / S.015 / pH: x  Gluc: x / Ketone: Negative  / Bili: Negative / Urobili: Negative   Blood: x / Protein: 30 mg/dL / Nitrite: Negative   Leuk Esterase: Negative / RBC: 0-2 /HPF / WBC 0-2   Sq Epi: x / Non Sq Epi: Occasional / Bacteria: Occasional                 Blood Culture:   Culture - Blood (22 @ 20:03)    Specimen Source: .Blood None    Culture Results:   No growth to date.      RADIOLOGY/EKG: all reviewed    12 Lead ECG (22 @ 06:58) >  Ventricular Rate 77 BPM    Atrial Rate 77 BPM    P-R Interval 164 ms    QRS Duration 88 ms    Q-T Interval 428 ms    QTC Calculation(Bazett) 484 ms    P Axis 46 degrees    R Axis 27 degrees    T Axis 45 degrees    Diagnosis Line Normal sinus rhythm  Prolonged QT  When compared with ECG of 14-SEP-2016 15:19,  No significant change was found    < from: NM Hepatobiliary Imaging (22 @ 12:20) >  IMPRESSION:  Abnormal morphine-augmented hepatobiliary scan compatible   with acute cholecystitis.    < end of copied text >  < from: US Abdomen Upper Quadrant Right (22 @ 18:54) >  FINDINGS:  Liver: Hepatic steatosis.  Bile ducts: Normal caliber. Common bile duct measures 7 mm.  Gallbladder: Gallstones. Gallbladder wall thickening gallbladder wall   edema measuring up to 0.5 cm. Sonographic Urena sign.  Pancreas:Visualized portions are within normal limits.  Right kidney: 9.9 cm. No hydronephrosis.  Ascites: None.  IVC: Visualized portions are within normal limits.    IMPRESSION:    Gallstones with gallbladder wall thickening and sonographic Urena sign   suggesting acute cholecystitis.      < end of copied text >    Medications:  MEDICATIONS  (STANDING):  acetaminophen   IVPB .. 1000 milliGRAM(s) IV Intermittent once  amLODIPine   Tablet 5 milliGRAM(s) Oral at bedtime  azithromycin   Tablet 250 milliGRAM(s) Oral <User Schedule>  brimonidine 0.1% Ophthalmic Solution 1 Drop(s) Left EYE two times a day  ciprofloxacin   IVPB 400 milliGRAM(s) IV Intermittent every 12 hours  enoxaparin Injectable 40 milliGRAM(s) SubCutaneous every 24 hours  losartan 50 milliGRAM(s) Oral daily  metroNIDAZOLE  IVPB 500 milliGRAM(s) IV Intermittent every 8 hours  sodium chloride 0.9%. 1000 milliLiter(s) (100 mL/Hr) IV Continuous <Continuous>    
Chief Complaint: RUQ pain, nausea, vomiting     HPI:    76 y/o female with PMHx of bronchiectasis, HTN, HLD , OP, GERD, Anxiety, Cholelithiasis admitted to surgery service with  c/o worsening epigastric pain radiating to back associated with nausea and vomiting started  night before eating. No fevers, cp, sob. No prior hx of abdominal surgeries.       - pt seen and examined for preop clearance, denies cp, dyspnea, RUQ pain improved, afebrile, plan discussed, medications reviewed    - pt seen and examined in am, RUQ persist, denies nausea, NPO for OR, denies cp, dyspnea, plapitations   - pt wheezing, dyspnic on 2 L sating at 95-90 % , denies cp, + RUQ pain, denies nausea, vomiting    Review of system- Rest of the review of system are negative except mentioned in HPI    PAST MEDICAL & SURGICAL HISTORY:  Hypertension  OA (osteoarthritis)  History of knee replacement  S/P cataract surgery    Social history : Denies ETOH use, IVDU, smoking   No pertinent history of stroke, MI,  DM  in first degree relatives.    Vitals reviewed for last 24 hours  T(C): 36.3 (22 @ 04:08), Max: 36.7 (22 @ 23:32)  T(F): 97.4 (22 @ 04:08), Max: 98 (22 @ 23:32)  HR: 76 (22 @ 04:08) (63 - 78)  BP: 107/60 (22 @ 04:08) (90/78 - 114/74)  RR: 16 (22 @ 04:08) (12 - 18)  SpO2: 96% (22 @ 04:08) (91% - 100%)  Wt(kg): --      PHYSICAL EXAM:    Constitutional: NAD, awake and alert, well-developed  HEENT: PERR, EOMI, Normal Hearing, MMM  Neck: Soft and supple, No LAD, No JVD  Respiratory: Breath sounds decreased  bilaterally, +  wheezing, rales or rhonchi  Cardiovascular: S1 and S2, regular rate and rhythm, no Murmurs, gallops or rubs  Gastrointestinal: Bowel Sounds present, soft, RUQ tenderness  nondistended, no guarding, no rebound  Extremities: No peripheral edema  Vascular: 2+ peripheral pulses  Neurological: A/O x 3, no focal deficits  Musculoskeletal: 5/5 strength b/l upper and lower extremities  Skin: No rashes      Labs: All Labs Reviewed:      141  |  111<H>  |  10  ----------------------------<  159<H>  5.2   |  24  |  0.88    Ca    7.6<L>      27 Aug 2022 06:49  Phos  4.3       Mg     2.0         TPro  5.8<L>  /  Alb  2.4<L>  /  TBili  0.4  /  DBili  0.2  /  AST  87<H>  /  ALT  61  /  AlkPhos  76                              11.8   15.47 )-----------( 158      ( 27 Aug 2022 06:49 )             36.5             LIVER FUNCTIONS - ( 27 Aug 2022 06:49 )  Alb: 2.4 g/dL / Pro: 5.8 gm/dL / ALK PHOS: 76 U/L / ALT: 61 U/L / AST: 87 U/L / GGT: x             PT/INR - ( 26 Aug 2022 06:43 )   PT: 15.2 sec;   INR: 1.31 ratio         PTT - ( 26 Aug 2022 06:43 )  PTT:29.6 sec          140  |  109<H>  |  8   ----------------------------<  132<H>  3.4<L>   |  24  |  0.63    Ca    8.3<L>      26 Aug 2022 06:43  Phos  1.6       Mg     1.8         TPro  6.2  /  Alb  2.9<L>  /  TBili  0.9  /  DBili  x   /  AST  78<H>  /  ALT  57  /  AlkPhos  82                              12.7   15.15 )-----------( 171      ( 26 Aug 2022 06:43 )             38.6             LIVER FUNCTIONS - ( 26 Aug 2022 06:43 )  Alb: 2.9 g/dL / Pro: 6.2 gm/dL / ALK PHOS: 82 U/L / ALT: 57 U/L / AST: 78 U/L / GGT: x             PT/INR - ( 26 Aug 2022 06:43 )   PT: 15.2 sec;   INR: 1.31 ratio         PTT - ( 26 Aug 2022 06:43 )  PTT:29.6 sec    Urinalysis Basic - ( 25 Aug 2022 12:53 )    Color: Yellow / Appearance: Clear / S.015 / pH: x  Gluc: x / Ketone: Negative  / Bili: Negative / Urobili: Negative   Blood: x / Protein: 30 mg/dL / Nitrite: Negative   Leuk Esterase: Negative / RBC: 0-2 /HPF / WBC 0-2   Sq Epi: x / Non Sq Epi: Occasional / Bacteria: Occasional                 Blood Culture:   Culture - Blood (22 @ 20:03)    Specimen Source: .Blood None    Culture Results:   No growth to date.      RADIOLOGY/EKG: all reviewed    12 Lead ECG (22 @ 06:58) >  Ventricular Rate 77 BPM    Atrial Rate 77 BPM    P-R Interval 164 ms    QRS Duration 88 ms    Q-T Interval 428 ms    QTC Calculation(Bazett) 484 ms    P Axis 46 degrees    R Axis 27 degrees    T Axis 45 degrees    Diagnosis Line Normal sinus rhythm  Prolonged QT  When compared with ECG of 14-SEP-2016 15:19,  No significant change was found    < from: NM Hepatobiliary Imaging (22 @ 12:20) >  IMPRESSION:  Abnormal morphine-augmented hepatobiliary scan compatible   with acute cholecystitis.    < end of copied text >  < from: US Abdomen Upper Quadrant Right (22 @ 18:54) >  FINDINGS:  Liver: Hepatic steatosis.  Bile ducts: Normal caliber. Common bile duct measures 7 mm.  Gallbladder: Gallstones. Gallbladder wall thickening gallbladder wall   edema measuring up to 0.5 cm. Sonographic Urena sign.  Pancreas:Visualized portions are within normal limits.  Right kidney: 9.9 cm. No hydronephrosis.  Ascites: None.  IVC: Visualized portions are within normal limits.    IMPRESSION:    Gallstones with gallbladder wall thickening and sonographic Urena sign   suggesting acute cholecystitis.      < end of copied text >    Medications:  MEDICATIONS  (STANDING):  acetaminophen   IVPB .. 1000 milliGRAM(s) IV Intermittent once  amLODIPine   Tablet 5 milliGRAM(s) Oral at bedtime  azithromycin   Tablet 250 milliGRAM(s) Oral <User Schedule>  brimonidine 0.1% Ophthalmic Solution 1 Drop(s) Left EYE two times a day  ciprofloxacin   IVPB 400 milliGRAM(s) IV Intermittent every 12 hours  enoxaparin Injectable 40 milliGRAM(s) SubCutaneous every 24 hours  losartan 50 milliGRAM(s) Oral daily  metroNIDAZOLE  IVPB 500 milliGRAM(s) IV Intermittent every 8 hours  sodium chloride 0.9%. 1000 milliLiter(s) (100 mL/Hr) IV Continuous <Continuous>    
Patient seen at bedside, patient resting comfortable.  Patient denies pain, nausea, vomiting.  No event overnight    Vital Signs Last 24 Hrs  T(C): 37 (25 Aug 2022 21:05), Max: 37 (25 Aug 2022 21:05)  T(F): 98.6 (25 Aug 2022 21:05), Max: 98.6 (25 Aug 2022 21:05)  HR: 91 (25 Aug 2022 21:05) (79 - 91)  BP: 151/76 (25 Aug 2022 21:05) (120/69 - 151/76)  RR: 18 (25 Aug 2022 21:05) (18 - 18)  SpO2: 92% (25 Aug 2022 21:05) (92% - 94%)    Parameters below as of 25 Aug 2022 21:05  Patient On (Oxygen Delivery Method): room air      Physical Exam:  General: AAOx3, Well developed, NAD  Chest: Normal respiratory effort  Heart: RRR  Abdomen: Soft, non distended, tender to palpation in the RUQ  Neuro/Psych: No localized deficits. Normal spech, normal tone  Skin: Normal, no rashes, no lesions noted.   Extremities: Warm, well perfused, no edema, Pulses intact    LABS:                        12.7   15.15 )-----------( 171      ( 26 Aug 2022 06:43 )             38.6     26 Aug 2022 06:43    140    |  109    |  8      ----------------------------<  132    3.4     |  24     |  0.63     Ca    8.3        26 Aug 2022 06:43  Phos  1.6       26 Aug 2022 06:43  Mg     1.8       26 Aug 2022 06:43    TPro  6.2    /  Alb  2.9    /  TBili  0.9    /  DBili  x      /  AST  78     /  ALT  57     /  AlkPhos  82     26 Aug 2022 06:43    PT/INR - ( 26 Aug 2022 06:43 )   PT: 15.2 sec;   INR: 1.31 ratio         PTT - ( 26 Aug 2022 06:43 )  PTT:29.6 sec
Patient seen at bedside, patient resting comfortable.  Patient denies pain, nausea, vomiting.  No event overnight    Vitals:  T(C): 36.6 (08-25 @ 05:09), Max: 36.9 (08-24 @ 17:29)  HR: 82 (08-25 @ 05:09) (75 - 83)  BP: 137/62 (08-25 @ 05:09) (132/74 - 154/76)  RR: 18 (08-25 @ 05:09) (18 - 18)  SpO2: 93% (08-25 @ 05:09) (92% - 97%)      Physical Exam:  General: AAOx3, Well developed, NAD  Chest: Normal respiratory effort  Heart: RRR  Abdomen: Soft, non distended, tender to palpation in the RUQ  Neuro/Psych: No localized deficits. Normal spech, normal tone  Skin: Normal, no rashes, no lesions noted.   Extremities: Warm, well perfused, no edema, Pulses intact    08-24 @ 18:51                    16.3  CBC: 18.16>)-------(<245                     46.9                 136 | 102 | 14    CMP:  ----------------------< 164               3.7 | 30 | 0.78                      Ca:9.7  Phos:-  Mg:-               0.7|      |25        LFTs:  ------|91|-----             -|      |-      Current Inpatient Medications:  acetaminophen   IVPB .. 1000 milliGRAM(s) IV Intermittent once  amLODIPine   Tablet 5 milliGRAM(s) Oral at bedtime  brimonidine 0.1% Ophthalmic Solution 1 Drop(s) Left EYE two times a day  ciprofloxacin   IVPB 400 milliGRAM(s) IV Intermittent every 12 hours  enoxaparin Injectable 40 milliGRAM(s) SubCutaneous every 24 hours  ketorolac   Injectable 15 milliGRAM(s) IV Push once PRN  losartan 50 milliGRAM(s) Oral daily  metroNIDAZOLE  IVPB 500 milliGRAM(s) IV Intermittent every 8 hours  morphine  - Injectable 2 milliGRAM(s) IV Push every 4 hours PRN  ondansetron Injectable 4 milliGRAM(s) IV Push every 6 hours PRN  senna 2 Tablet(s) Oral at bedtime PRN  sodium chloride 0.9%. 1000 milliLiter(s) (100 mL/Hr) IV Continuous <Continuous>  < from: US Abdomen Upper Quadrant Right (08.24.22 @ 18:54) >  FINDINGS:  Liver: Hepatic steatosis.  Bile ducts: Normal caliber. Common bile duct measures 7 mm.  Gallbladder: Gallstones. Gallbladder wall thickening gallbladder wall   edema measuring up to 0.5 cm. Sonographic Urena sign.  Pancreas:Visualized portions are within normal limits.  Right kidney: 9.9 cm. No hydronephrosis.  Ascites: None.  IVC: Visualized portions are within normal limits.    < end of copied text >      
Patient seen at bedside, patient resting comfortable. Pt states she has RUQ  soreness, pt is passing gas  Patient denies pain, nausea, vomiting.  No event overnight    Vitals:  T(C): 36.3 ( @ 04:08), Max: 36.7 ( @ 23:32)  HR: 76 ( @ 04:08) (63 - 84)  BP: 107/60 ( @ 04:08) (90/78 - 133/64)  RR: 16 ( @ 04:08) (12 - 18)  SpO2: 96% ( @ 04:08) (91% - 100%)     @ 07:01  -   @ 07:00  --------------------------------------------------------  IN:    Other (mL): 1000 mL    sodium chloride 0.9%: 600 mL  Total IN: 1600 mL    OUT:    Voided (mL): 125 mL  Total OUT: 125 mL    Total NET: 1475 mL      Physical Exam:  General: AAOx3, obese habitus female, NAD  Chest: Normal respiratory effort  Heart: RRR  Abdomen: Soft, mildly distended, incision with no signs of infection, appropriate tenderness  Neuro/Psych: No localized deficits. Normal speech, normal tone  Skin: Normal, no rashes, no lesions noted.   Extremities: Warm, well perfused, no edema, Pulses intact     @ 06:49                    11.8  CBC: 15.47>)-------(<158                     36.5                 - | - | -    CMP:  ----------------------< -               - | - | -                      Ca:-  Phos:-  Mg:-               -|      |-        LFTs:  ------|-|-----             -|      |-   @ 06:43                    12.7  CBC: 15.15>)-------(<171                     38.6                 140 | 109 | 8    CMP:  ----------------------< 132               3.4 | 24 | 0.63                      Ca:8.3  Phos:1.6  M.8               0.9|      |78        LFTs:  ------|82|-----             -|      |-      Culture - Blood (collected 22 @ 20:03)  Source: .Blood None  Preliminary Report (22 @ 04:01):    No growth to date.    Culture - Blood (collected 22 @ 20:01)  Source: .Blood None  Preliminary Report (22 @ 02:02):    No growth to date.      Current Inpatient Medications:  acetaminophen   IVPB .. 1000 milliGRAM(s) IV Intermittent once  ALBUTerol    90 MICROgram(s) HFA Inhaler 2 Puff(s) Inhalation every 6 hours PRN  albuterol/ipratropium for Nebulization 3 milliLiter(s) Nebulizer once  amLODIPine   Tablet 5 milliGRAM(s) Oral at bedtime  azithromycin   Tablet 250 milliGRAM(s) Oral <User Schedule>  brimonidine 0.1% Ophthalmic Solution 1 Drop(s) Left EYE two times a day  ciprofloxacin   IVPB 400 milliGRAM(s) IV Intermittent every 12 hours  desvenlafaxine ER 25 milliGRAM(s) Oral daily  enoxaparin Injectable 40 milliGRAM(s) SubCutaneous every 24 hours  losartan 50 milliGRAM(s) Oral daily  metroNIDAZOLE  IVPB 500 milliGRAM(s) IV Intermittent every 8 hours  morphine  - Injectable 2 milliGRAM(s) IV Push every 4 hours PRN  nebivolol 10 milliGRAM(s) Oral daily  ondansetron Injectable 4 milliGRAM(s) IV Push every 6 hours PRN  senna 2 Tablet(s) Oral at bedtime PRN  sodium chloride 0.9%. 1000 milliLiter(s) (100 mL/Hr) IV Continuous <Continuous>      
Patient seen at bedside, patient resting comfortable. Pt states she has RUQ soreness, pt is passing gas, having bowel movements, pt states breathing better still on nasal canula  Patient denies pain, nausea, vomiting.  No event overnight      Physical Exam:  General: AAOx3, obese habitus female, NAD  Chest: Normal respiratory effort  Heart: RRR  Abdomen: Soft, mildly distended, incision with no signs of infection, appropriate tenderness  Neuro/Psych: No localized deficits. Normal speech, normal tone  Skin: Normal, no rashes, no lesions noted.   Extremities: Warm, well perfused, no edema, Pulses intact    Vitals:  T(C): 36.7 ( @ 05:55), Max: 36.7 ( @ 15:39)  HR: 68 ( @ 05:55) (68 - 81)  BP: 110/59 ( @ 05:55) (97/51 - 110/59)  RR: 16 ( @ 05:55) (16 - 18)  SpO2: 93% ( @ 05:55) (93% - 96%)     @ 07:01  -   @ 07:00  --------------------------------------------------------  IN:  Total IN: 0 mL    OUT:    Voided (mL): 800 mL  Total OUT: 800 mL    Total NET: -800 mL       @ 06:49                    11.8  CBC: 15.47>)-------(<158                     36.5                 141 | 111 | 10    CMP:  ----------------------< 159               5.2 | 24 | 0.88                      Ca:7.6  Phos:4.3  M.0               0.4|      |87        LFTs:  ------|76|-----             0.2|      |-      Culture - Blood (collected 22 @ 20:03)  Source: .Blood None  Preliminary Report (22 @ 04:01):    No growth to date.    Culture - Blood (collected 08-24-22 @ 20:01)  Source: .Blood None  Preliminary Report (22 @ 02:02):    No growth to date.      Current Inpatient Medications:  acetaminophen     Tablet .. 650 milliGRAM(s) Oral every 6 hours PRN  acetaminophen   IVPB .. 1000 milliGRAM(s) IV Intermittent once  ALBUTerol    90 MICROgram(s) HFA Inhaler 2 Puff(s) Inhalation every 6 hours PRN  albuterol/ipratropium for Nebulization 3 milliLiter(s) Nebulizer once  amLODIPine   Tablet 5 milliGRAM(s) Oral at bedtime  azithromycin   Tablet 250 milliGRAM(s) Oral <User Schedule>  brimonidine 0.1% Ophthalmic Solution 1 Drop(s) Left EYE two times a day  ciprofloxacin   IVPB 400 milliGRAM(s) IV Intermittent every 12 hours  desvenlafaxine ER 25 milliGRAM(s) Oral daily  enoxaparin Injectable 40 milliGRAM(s) SubCutaneous every 24 hours  losartan 50 milliGRAM(s) Oral daily  metroNIDAZOLE  IVPB 500 milliGRAM(s) IV Intermittent every 8 hours  morphine  - Injectable 2 milliGRAM(s) IV Push every 4 hours PRN  nebivolol 10 milliGRAM(s) Oral daily  ondansetron Injectable 4 milliGRAM(s) IV Push every 6 hours PRN  senna 2 Tablet(s) Oral at bedtime PRN

## 2022-08-29 NOTE — PHYSICAL THERAPY INITIAL EVALUATION ADULT - GENERAL OBSERVATIONS, REHAB EVAL
Pt rec'd sitting up in bedside chair, pleasant and cooperative with PT, endorses min flank pain. Pt reports ambulating independently in room.

## 2022-08-29 NOTE — PROGRESS NOTE ADULT - REASON FOR ADMISSION
cholelithiasis

## 2022-08-30 LAB
CULTURE RESULTS: SIGNIFICANT CHANGE UP
CULTURE RESULTS: SIGNIFICANT CHANGE UP
SPECIMEN SOURCE: SIGNIFICANT CHANGE UP
SPECIMEN SOURCE: SIGNIFICANT CHANGE UP

## 2022-09-02 DIAGNOSIS — K80.12 CALCULUS OF GALLBLADDER WITH ACUTE AND CHRONIC CHOLECYSTITIS WITHOUT OBSTRUCTION: ICD-10-CM

## 2022-09-02 DIAGNOSIS — J96.01 ACUTE RESPIRATORY FAILURE WITH HYPOXIA: ICD-10-CM

## 2022-09-02 DIAGNOSIS — E78.5 HYPERLIPIDEMIA, UNSPECIFIED: ICD-10-CM

## 2022-09-02 DIAGNOSIS — J98.11 ATELECTASIS: ICD-10-CM

## 2022-09-02 DIAGNOSIS — H40.9 UNSPECIFIED GLAUCOMA: ICD-10-CM

## 2022-09-02 DIAGNOSIS — Z98.49 CATARACT EXTRACTION STATUS, UNSPECIFIED EYE: ICD-10-CM

## 2022-09-02 DIAGNOSIS — J18.9 PNEUMONIA, UNSPECIFIED ORGANISM: ICD-10-CM

## 2022-09-02 DIAGNOSIS — E87.6 HYPOKALEMIA: ICD-10-CM

## 2022-09-02 DIAGNOSIS — Z91.09 OTHER ALLERGY STATUS, OTHER THAN TO DRUGS AND BIOLOGICAL SUBSTANCES: ICD-10-CM

## 2022-09-02 DIAGNOSIS — Z88.0 ALLERGY STATUS TO PENICILLIN: ICD-10-CM

## 2022-09-02 DIAGNOSIS — M19.90 UNSPECIFIED OSTEOARTHRITIS, UNSPECIFIED SITE: ICD-10-CM

## 2022-09-02 DIAGNOSIS — Z96.659 PRESENCE OF UNSPECIFIED ARTIFICIAL KNEE JOINT: ICD-10-CM

## 2022-09-02 DIAGNOSIS — E87.70 FLUID OVERLOAD, UNSPECIFIED: ICD-10-CM

## 2022-09-02 DIAGNOSIS — Z88.1 ALLERGY STATUS TO OTHER ANTIBIOTIC AGENTS STATUS: ICD-10-CM

## 2022-09-02 DIAGNOSIS — J47.9 BRONCHIECTASIS, UNCOMPLICATED: ICD-10-CM

## 2022-09-02 DIAGNOSIS — I10 ESSENTIAL (PRIMARY) HYPERTENSION: ICD-10-CM

## 2022-09-02 DIAGNOSIS — E83.42 HYPOMAGNESEMIA: ICD-10-CM

## 2022-09-02 DIAGNOSIS — K21.9 GASTRO-ESOPHAGEAL REFLUX DISEASE WITHOUT ESOPHAGITIS: ICD-10-CM

## 2022-09-02 DIAGNOSIS — J90 PLEURAL EFFUSION, NOT ELSEWHERE CLASSIFIED: ICD-10-CM

## 2022-09-02 DIAGNOSIS — E83.39 OTHER DISORDERS OF PHOSPHORUS METABOLISM: ICD-10-CM

## 2022-09-02 DIAGNOSIS — Z88.8 ALLERGY STATUS TO OTHER DRUGS, MEDICAMENTS AND BIOLOGICAL SUBSTANCES STATUS: ICD-10-CM
